# Patient Record
Sex: FEMALE | Employment: UNEMPLOYED
[De-identification: names, ages, dates, MRNs, and addresses within clinical notes are randomized per-mention and may not be internally consistent; named-entity substitution may affect disease eponyms.]

---

## 2018-11-21 ENCOUNTER — HOSPITAL ENCOUNTER (OUTPATIENT)
Dept: PHYSICAL THERAPY | Facility: CLINIC | Age: 38
Setting detail: THERAPIES SERIES
Discharge: HOME | End: 2018-11-21
Payer: COMMERCIAL

## 2018-11-21 PROCEDURE — 97750 PHYSICAL PERFORMANCE TEST: CPT

## 2018-11-21 PROCEDURE — 97161 PT EVAL LOW COMPLEX 20 MIN: CPT

## 2018-11-21 PROCEDURE — 97140 MANUAL THERAPY 1/> REGIONS: CPT

## 2018-11-21 PROCEDURE — 97110 THERAPEUTIC EXERCISES: CPT

## 2018-11-21 NOTE — CONSULTS
WNL WNL 5 4 Joelles   []   ADD     Pat-Fem Grind   []   Knee Flex WNL WNL 5 5 FADIRs   []   Ext     Hip Scouring   []   Ankle DF WNL Stiff at end range   ARTIEs   []   PF WNL WNL   Piriformis   []   INV WNL WNL   Connies   []   EVER WNL WNL   Scooter   - + []   GTE WNL 50% limited      []       OBSERVATION No Deficit Deficit Not Tested Comments   Posture       Forward Head [] [] []    Rounded Shoulders [] [] []    Kyphosis [] [] []    Lordosis [] [] []    Scoliosis [] [] []    Iliac Crest [] [x] [] R anterior innom rotation   PSIS [] [] []    ASIS [] [] []    Genu Valgus [] [] []    Genu Varus [] [] []    Genu Recurvatum [] [] []    Pronation [] [] []    Supination [] [] []    Leg Length Discrp [] [] []    Slumped Sitting [] [] []    Palpation [] [x] [] B hip flexor spasm, R glute med and piriformis spasm, R Plantarfascia fibrotic   Sensation [] [] []    Edema [] [] []    Neurological [] [] []    Patellar Mobility [] [] []    Patellar Orientation [] [] []    Gait [] [x] [] Analysis:See run analysis     Video Run Analysis   Warm up:2 min   Pace: 9:31 min/mile   Duration: 10 minutes    Shoes: NB   Shirley: 164 spm  Increased to 172spm   Frontal plane deviations:    Increased pronation R    Dynamic genu valgus    Contralateral pelvic drop           Sagittal plane deviations:     Near full knee extension at initial contact    Elevated foot ground angle at initial contact    Limited hip ext from midstance to toe off    Knees extend over toes at midstance     Recommendations:     Increase shirley by 7%    Improve flexibility at R ankle, great toe and hip ext    Improve hip strength         FUNCTIONAL TESTS PAIN NO PAIN COMMENTS   1 legged squat [] [x] R dynamic genu valgus and R lateral lean, L dynamic genu valgus   1 legged heel raise  [] [x] Positive post tib dysfunction B     Comments:  Assessment:  Pt presents with lack of hip extension ROM and strength leading to form flaws as listed above causing pain at R TFL, Comments   Prone        Flying squirrels        Hip ext (glut max)                Supine        Hip flexor s 3x30\"  x x    1 legged bridges 2x10  x x    Frog legged bridges x30  x x    Sidelying        Clams 90/30 deg 2 sets  x x    Bere hip abd 2 sets  x x    Gym        Lunges        Monster walks        Heel taps        Step downs     Posterior and lateral   Other:    Specific Instructions for next treatment:calf stretch, progress to standing program, run form     Treatment Charges: Mins Units   [x] Evaluation       [x]  Low       []  Moderate       []  High 15 1   [x] Phys perf test 10 1   [x]  Ther Exercise 20 1   [x]  Manual Therapy 15 1   []  Ther Activities     []  Aquatics     []  Vasocompression     []  NMR 60 4     TOTAL TREATMENT TIME: 60    Time in:11:00   Time Out:1210    Electronically signed by: Anitra Cavazos PT    As we have a standing verbal order from Dr. Charles Cruz, once signed, this eval/POC will serve as the formal written order. Physician Signature:________________________________Date:__________________  By signing above or cosigning this note, I have reviewed this plan of care and certify a need for medically necessary rehabilitation services.      *PLEASE SIGN ABOVE AND FAX BACK ALL PAGES*

## 2018-11-28 ENCOUNTER — HOSPITAL ENCOUNTER (OUTPATIENT)
Dept: PHYSICAL THERAPY | Facility: CLINIC | Age: 38
Setting detail: THERAPIES SERIES
Discharge: HOME | End: 2018-11-28
Payer: COMMERCIAL

## 2018-11-28 PROCEDURE — 97140 MANUAL THERAPY 1/> REGIONS: CPT

## 2018-11-28 PROCEDURE — 97110 THERAPEUTIC EXERCISES: CPT

## 2018-11-28 PROCEDURE — 97112 NEUROMUSCULAR REEDUCATION: CPT

## 2018-12-10 ENCOUNTER — HOSPITAL ENCOUNTER (OUTPATIENT)
Dept: PHYSICAL THERAPY | Facility: CLINIC | Age: 38
Setting detail: THERAPIES SERIES
Discharge: HOME | End: 2018-12-10
Payer: COMMERCIAL

## 2018-12-10 PROCEDURE — 97140 MANUAL THERAPY 1/> REGIONS: CPT

## 2018-12-10 PROCEDURE — 97112 NEUROMUSCULAR REEDUCATION: CPT

## 2018-12-10 PROCEDURE — 97110 THERAPEUTIC EXERCISES: CPT

## 2018-12-17 ENCOUNTER — HOSPITAL ENCOUNTER (OUTPATIENT)
Dept: PHYSICAL THERAPY | Facility: CLINIC | Age: 38
Setting detail: THERAPIES SERIES
Discharge: HOME | End: 2018-12-17
Payer: COMMERCIAL

## 2018-12-17 PROCEDURE — 97112 NEUROMUSCULAR REEDUCATION: CPT

## 2018-12-17 PROCEDURE — 97140 MANUAL THERAPY 1/> REGIONS: CPT

## 2018-12-17 PROCEDURE — 97110 THERAPEUTIC EXERCISES: CPT

## 2018-12-17 NOTE — FLOWSHEET NOTE
toward goals. [] No change. [x] Other: Pt is reverting back to old habits with lower castro and overstriding. Cautioned that it is easier change run mechanics solo and on a treadmill. Decreased spasm at TFL and psoas. Continue to work on [address] med strength and Neuromuscular control in the frontal plane  Pt. Education:  [x] Yes  [] No  [x] Reviewed Prior HEP/Ed  Method of Education: [x] Verbal  [x] Demo  [x] Written  [x] Verbalizes understanding. [] Demonstrates understanding. [] Needs review. [] Demonstrates/verbalizes HEP/Ed previously given. Plan: [x] Continue per plan of care.    [] Other:      Time In:0901          Time Out: 1010    Electronically signed by:  Isaura Garza, PT

## 2018-12-24 ENCOUNTER — HOSPITAL ENCOUNTER (OUTPATIENT)
Dept: PHYSICAL THERAPY | Facility: CLINIC | Age: 38
Setting detail: THERAPIES SERIES
Discharge: HOME | End: 2018-12-24
Payer: COMMERCIAL

## 2018-12-24 PROCEDURE — 97140 MANUAL THERAPY 1/> REGIONS: CPT

## 2018-12-24 PROCEDURE — 97112 NEUROMUSCULAR REEDUCATION: CPT

## 2018-12-24 PROCEDURE — 97110 THERAPEUTIC EXERCISES: CPT

## 2018-12-31 ENCOUNTER — HOSPITAL ENCOUNTER (OUTPATIENT)
Dept: PHYSICAL THERAPY | Facility: CLINIC | Age: 38
Setting detail: THERAPIES SERIES
Discharge: HOME | End: 2018-12-31
Payer: COMMERCIAL

## 2018-12-31 PROCEDURE — 97110 THERAPEUTIC EXERCISES: CPT

## 2018-12-31 PROCEDURE — 97140 MANUAL THERAPY 1/> REGIONS: CPT

## 2018-12-31 PROCEDURE — 97112 NEUROMUSCULAR REEDUCATION: CPT

## 2019-01-07 ENCOUNTER — HOSPITAL ENCOUNTER (OUTPATIENT)
Dept: PHYSICAL THERAPY | Facility: CLINIC | Age: 39
Setting detail: THERAPIES SERIES
Discharge: HOME | End: 2019-01-07
Payer: COMMERCIAL

## 2019-01-07 PROCEDURE — 97110 THERAPEUTIC EXERCISES: CPT

## 2019-01-07 PROCEDURE — 97112 NEUROMUSCULAR REEDUCATION: CPT

## 2019-01-07 PROCEDURE — 97140 MANUAL THERAPY 1/> REGIONS: CPT

## 2019-01-22 ENCOUNTER — HOSPITAL ENCOUNTER (OUTPATIENT)
Dept: PHYSICAL THERAPY | Facility: CLINIC | Age: 39
Setting detail: THERAPIES SERIES
Discharge: HOME | End: 2019-01-22
Payer: COMMERCIAL

## 2019-01-22 PROCEDURE — 97140 MANUAL THERAPY 1/> REGIONS: CPT

## 2019-01-22 PROCEDURE — 97112 NEUROMUSCULAR REEDUCATION: CPT

## 2019-01-22 PROCEDURE — 97110 THERAPEUTIC EXERCISES: CPT

## 2019-02-04 ENCOUNTER — HOSPITAL ENCOUNTER (OUTPATIENT)
Dept: PHYSICAL THERAPY | Facility: CLINIC | Age: 39
Setting detail: THERAPIES SERIES
Discharge: HOME | End: 2019-02-04
Payer: COMMERCIAL

## 2019-02-04 PROCEDURE — 97112 NEUROMUSCULAR REEDUCATION: CPT

## 2019-02-04 PROCEDURE — 97110 THERAPEUTIC EXERCISES: CPT

## 2019-02-18 ENCOUNTER — HOSPITAL ENCOUNTER (OUTPATIENT)
Dept: PHYSICAL THERAPY | Facility: CLINIC | Age: 39
Setting detail: THERAPIES SERIES
Discharge: HOME | End: 2019-02-18
Payer: COMMERCIAL

## 2019-02-18 PROCEDURE — 97140 MANUAL THERAPY 1/> REGIONS: CPT

## 2019-02-18 PROCEDURE — 97110 THERAPEUTIC EXERCISES: CPT

## 2019-08-05 ENCOUNTER — HOSPITAL ENCOUNTER (OUTPATIENT)
Dept: PHYSICAL THERAPY | Facility: CLINIC | Age: 39
Setting detail: THERAPIES SERIES
Discharge: HOME | End: 2019-08-05
Payer: COMMERCIAL

## 2019-08-05 PROCEDURE — 97140 MANUAL THERAPY 1/> REGIONS: CPT

## 2019-08-05 PROCEDURE — 97161 PT EVAL LOW COMPLEX 20 MIN: CPT

## 2019-08-05 NOTE — CONSULTS
[x] Casandra. [address] [facility] [employer]    [address] [place] [address]     Phone: [phone]     Fax:  [phone]      Physical Therapy Running Evaluation     Date:  2019  Patient: Irlanda Hill            : 1980                      MRN: [id number]  Physician: Dr. Costello Base: [employer]  Medical Diagnosis: L hamstring pain                     Rehab Codes:S76.312A  Onset date: 19                                         Next 's appt.: prn     Subjective:   CC:Pt reports she felt this pain start 2 weeks ago while she was on a run. She reports that she has not been able to train how she would like. She started on a 14 mile run a few days ago and had to end it at 6 miles due to L hamstring pain        PMHx: [x] Unremarkable            [] Diabetes         [] HTN                [] Pacemaker              [] MI/Heart Problems     [] Cancer           [] Arthritis           [] Other:              [x] Refer to full medical chart  In EPIC      Tests:  [] X-Ray:            [] MRI:                [x] none:      Medications: [x] Refer to full medical record      [] None   [] Other:  Allergies:      [x] Refer to full medical record      [] None   [] Other:     Working:  [x] Normal Duty  [] Light Duty  [] Off D/T Condition  [] Retired    [] Not Employed    []  Disability  [] Other:           Return to work:                 Job/ADL Description:  CNP NICU at [address]   Next goal race:  Ramya full in December       Pain:  [x] Yes  [] No           Location: L hip    Pain Rating: (0-10 scale) 7/10  Pain altered Tx:  [] Yes  [x] No  Action:  Symptoms:  [] Improving           [] Worsening      [x] Same  Better:  [] AM    [] PM    [] Sit    [] Not running  [x]Stand    [] Walk    [] Stretching  [] Other:  Worse: [] AM    [] PM    [x] Sit    []Stand    [x] Walk    [] Stairs    [x] Run    [] Other:  Sleep: [x] OK    [] Disturbed     Objective: Deviations  [] Other:                  STG: (to be met in 9 treatments)  1. ? Pain:<3/10 R LE pain with running up to 6 miles  2. ? ROM: Normal  ankle DF and and great toe extension to aide in run mechanics  3. ? Strength: 5/5 B hip strength to aide in run mechanics  4. ? Function:Able to run up to 6 miles with <3/10 L LE pain. Able to hold corrections made with manual techniques. 5. Independent with Home Exercise Programs     LTG: (to be met in 18 treatments)  1. Pt to demonstrate single leg squat with little to no genu valgus or lateral trunk lean and able to demo single leg heel raise without valgus collapse on lowering  2. Able to run as she wishes without LLE pain                 Patient goals:Get back to running       Rehab Potential:  [x] Good  [] Fair  [] Poor   Suggested Professional Referral:  [x] No  [] Yes:  Barriers to Goal Achievement[de-identified]  [x] No  [] Yes:  Domestic Concerns:  [x] No  [] Yes:     Pt. Education:  [x] Plans/Goals, Risks/Benefits discussed  [x] Home exercise program    Method of Education: [x] Verbal  [x] Demo  [x] Written  Comprehension of Education:  [] Verbalizes understanding. [] Demonstrates understanding. [] Needs Review.   [] Demonstrates/verbalizes understanding of HEP/Ed previously given.     Treatment Plan:  [x] Therapeutic Exercise             [] Therapeutic Activity  [x] Manual Therapy                                 [x] Alter G treadmill  [x] Phys perf test                         [x] Vasocompression/CP          [x] Neuromuscular Re-education            [] Massage                      [x] Instruction in HEP                              [] Aquatic Therapy                                                                                                                                          Frequency:  1-2x/week for 16 visits     Todays Treatment:  Manual: MET to correct L pelvic upslip, DI to hip flexors , glute med and piriformis, mobilization to T8, MFR to L

## 2019-08-08 ENCOUNTER — HOSPITAL ENCOUNTER (OUTPATIENT)
Dept: PHYSICAL THERAPY | Facility: CLINIC | Age: 39
Setting detail: THERAPIES SERIES
Discharge: HOME | End: 2019-08-08
Payer: COMMERCIAL

## 2019-08-08 PROCEDURE — 97140 MANUAL THERAPY 1/> REGIONS: CPT

## 2019-08-08 PROCEDURE — 97110 THERAPEUTIC EXERCISES: CPT

## 2019-08-13 ENCOUNTER — HOSPITAL ENCOUNTER (OUTPATIENT)
Dept: PHYSICAL THERAPY | Facility: CLINIC | Age: 39
Setting detail: THERAPIES SERIES
Discharge: HOME | End: 2019-08-13
Payer: COMMERCIAL

## 2019-08-13 PROCEDURE — 97140 MANUAL THERAPY 1/> REGIONS: CPT

## 2019-08-13 NOTE — FLOWSHEET NOTE
Vasocompression     []  Other     Total Treatment time 34 2       Assessment: [] Progressing toward goals. [] No change. [x] Other:Pt came in with L pelvic upslip that resolved with manual techniques. Pt has spasm at obturator externus that decreased with DI. Taught self release. She is to do self release and see if we can get more carryover in symptom relief so that we can do return to run. Cautioned pt that if she is feeling better not to run more than 1 mile to start. STG: (to be met in 9 treatments)  1. ? Pain:<3/10 R LE pain with running up to 6 miles  2. ? ROM: Normal  ankle DF and and great toe extension to aide in run mechanics  3. ? Strength: 5/5 B hip strength to aide in run mechanics  4. ? Function:Able to run up to 6 miles with <3/10 L LE pain. Able to hold corrections made with manual techniques. 5. Independent with Home Exercise Programs     LTG: (to be met in 18 treatments)  1. Pt to demonstrate single leg squat with little to no genu valgus or lateral trunk lean and able to demo single leg heel raise without valgus collapse on lowering  2. Able to run as she wishes without LLE pain     Patient goals:Get back to running    Pt. Education:  [] Yes  [] No  [] Reviewed Prior HEP/Ed  Method of Education: [] Verbal  [] Demo  [] Written  Comprehension of Education:  [] Verbalizes understanding. [] Demonstrates understanding. [] Needs review. [] Demonstrates/verbalizes HEP/Ed previously given. Plan: [x] Continue per plan of care.    [] Other:      Time In:1310          Time Out: 5216    Electronically signed by:  Madeline Sue, PT

## 2019-08-19 ENCOUNTER — HOSPITAL ENCOUNTER (OUTPATIENT)
Dept: PHYSICAL THERAPY | Facility: CLINIC | Age: 39
Setting detail: THERAPIES SERIES
Discharge: HOME | End: 2019-08-19
Payer: COMMERCIAL

## 2019-08-19 PROCEDURE — 97140 MANUAL THERAPY 1/> REGIONS: CPT

## 2019-08-19 NOTE — FLOWSHEET NOTE
level this date. Tenderness and spasm L hip musculature listed above. Pt was lacking segmental motion at lumbar spine that improved with mobilization and pressups. Improvement after glute max release and left painfree. Pt is to do pressups or standing trunk extension every 2 hours while awake. STG: (to be met in 9 treatments)  1. ? Pain:<3/10 R LE pain with running up to 6 miles  2. ? ROM: Normal  ankle DF and and great toe extension to aide in run mechanics  3. ? Strength: 5/5 B hip strength to aide in run mechanics  4. ? Function:Able to run up to 6 miles with <3/10 L LE pain. Able to hold corrections made with manual techniques. 5. Independent with Home Exercise Programs     LTG: (to be met in 18 treatments)  1. Pt to demonstrate single leg squat with little to no genu valgus or lateral trunk lean and able to demo single leg heel raise without valgus collapse on lowering  2. Able to run as she wishes without LLE pain     Patient goals:Get back to running    Pt. Education:  [] Yes  [] No  [] Reviewed Prior HEP/Ed  Method of Education: [] Verbal  [] Demo  [] Written  Comprehension of Education:  [] Verbalizes understanding. [] Demonstrates understanding. [] Needs review. [] Demonstrates/verbalizes HEP/Ed previously given. Plan: [x] Continue per plan of care.    [] Other:      Time In:0910          Time Out: 0945    Electronically signed by:  Clare Motley, PT

## 2019-08-26 ENCOUNTER — HOSPITAL ENCOUNTER (OUTPATIENT)
Dept: PHYSICAL THERAPY | Facility: CLINIC | Age: 39
Setting detail: THERAPIES SERIES
Discharge: HOME | End: 2019-08-26
Payer: COMMERCIAL

## 2019-08-26 PROCEDURE — 97140 MANUAL THERAPY 1/> REGIONS: CPT

## 2019-08-26 NOTE — FLOWSHEET NOTE
[] [address] [facility]   Outpatient Rehabilitation &  Therapy  [address] [place] [address]  P: [phone]  F: [phone]     Physical Therapy Daily Treatment Note    Date:  2019  Patient Name:  Lizz Gu    :  1980  MRN: [id number]  Insurance: [facility] Diagnosis: L hamstring pain                     Rehab Codes:S76.312A  Onset date: 19                                         Next Dr's appt.: prn  Visit# / total visits:     Cancels/No Shows: 0    Subjective:    Pain:  [] Yes  [] No Location: L hamstring Pain Rating: (0-10 scale) 2/10  Pain altered Tx:  [] No  [] Yes  Action:  Comments: I have something viral and am not feeling well today. Felt pretty good Pt reports she did a spin class last week and the first 40 min were good but the last 20 were not due to L hamstring pain.    Objective:  Manual:  DI to L  hip flexor proximal and distal , glute max and piriformis,MFR  to L adductors and TFL, Hip mobs: lateral glide, distraction, PA [id number]    Precautions: standard  Exercises:  Exercise Reps/ Time Weight/ Level Issued for HEP   Comments   B Gastroc and Soleus s 2'       w/ INV   Prone             Flying squirrels  2x10           Hip ext (glut max)             Pressups  4x10      x     Supine             Hip flexor s  2'        half kneel   Bridges x30   x      1 legged bridges             Frog legged bridges             Sidelying             Clams 90/30 deg     x       Bere hip abd     x       Gym             Lunges             Monster walks  2L  Blue         Heel taps             Step downs         Posterior and lateral   Other:     Specific Instructions for next treatment:Recheck pelvis, Run analysis      Treatment Charges: Mins Units   []  Modalities     [x]  Ther Exercise 5 NC   [x]  Manual Therapy 30 2   []  Ther Activities     []  Aquatics     []  Vasocompression     []  Other     Total Treatment time 35 2       Assessment: [] Progressing toward goals.    [] No change. [x] Other:Pt 's pelvis is level this date. Less proximal hip flexor spasm. Held exercise with the exception of pressups due to pt feeling ill. Still feeling heavy at the leg but normal hip flexion actively in standing post manual techniques. STG: (to be met in 9 treatments)  1. ? Pain:<3/10 R LE pain with running up to 6 miles  2. ? ROM: Normal  ankle DF and and great toe extension to aide in run mechanics  3. ? Strength: 5/5 B hip strength to aide in run mechanics  4. ? Function:Able to run up to 6 miles with <3/10 L LE pain. Able to hold corrections made with manual techniques. 5. Independent with Home Exercise Programs     LTG: (to be met in 18 treatments)  1. Pt to demonstrate single leg squat with little to no genu valgus or lateral trunk lean and able to demo single leg heel raise without valgus collapse on lowering  2. Able to run as she wishes without LLE pain     Patient goals:Get back to running    Pt. Education:  [] Yes  [] No  [] Reviewed Prior HEP/Ed  Method of Education: [] Verbal  [] Demo  [] Written  Comprehension of Education:  [] Verbalizes understanding. [] Demonstrates understanding. [] Needs review. [] Demonstrates/verbalizes HEP/Ed previously given. Plan: [x] Continue per plan of care.    [] Other:      Time In:0810         Time Out: 0840    Electronically signed by:  Madeline Sue, PT

## 2019-09-04 ENCOUNTER — HOSPITAL ENCOUNTER (OUTPATIENT)
Dept: PHYSICAL THERAPY | Facility: CLINIC | Age: 39
Setting detail: THERAPIES SERIES
Discharge: HOME | End: 2019-09-04
Payer: COMMERCIAL

## 2019-09-04 PROCEDURE — 97140 MANUAL THERAPY 1/> REGIONS: CPT

## 2019-09-04 PROCEDURE — 97110 THERAPEUTIC EXERCISES: CPT

## 2019-09-09 ENCOUNTER — HOSPITAL ENCOUNTER (OUTPATIENT)
Dept: PHYSICAL THERAPY | Facility: CLINIC | Age: 39
Setting detail: THERAPIES SERIES
Discharge: HOME | End: 2019-09-09
Payer: COMMERCIAL

## 2019-09-09 PROCEDURE — 97110 THERAPEUTIC EXERCISES: CPT

## 2019-09-09 PROCEDURE — 97140 MANUAL THERAPY 1/> REGIONS: CPT

## 2019-09-09 NOTE — FLOWSHEET NOTE
[x] [facility] [place]  Outpatient Rehabilitation &  Therapy  [address]  P: [phone]  F: [phone]     Physical Therapy Daily Treatment Note    Date:  2019  Patient Name:  Molly Chamberlain    :  1980  MRN: [id number]  Insurance: [facility]  Medical Diagnosis: L hamstring pain                     Rehab Codes:S76.312A  Onset date: 19                                         Next Dr's appt.: prn  Visit# / total visits:     Cancels/No Shows: 0    Subjective:    Pain:  [x] Yes  [] No Location: L hamstring Pain Rating: (0-10 scale) 3/10  Pain altered Tx:  [] No  [] Yes  Action:  Comments: Pt reports that she is having the same pain in her buttocks and it has not changed    Objective:  Manual:  DI to B  hip flexor proximal, piriformis,  MET to correct L pelvic upslip    Precautions: standard  Exercises:  Exercise Reps/ Time Weight/ Level Issued for HEP   Comments   Hamstring s 3x30\"   x    B Gastroc and Soleus s 2'       w/ INV   Prone             Flying squirrels  2x10           Hip ext (glut max)             Pressups  4x10      x     Supine             Hip flexor s  2'        half kneel   Bridges 2x15   x x     1 legged bridges             Roll for control 10x10\"       Frog legged bridges             Sidelying             Clams 90/30 deg     x       Bere hip abd     x       Gym             Lunges             Monster walks  2L  Blue         Heel taps             SB march w/ opp arm reach 2x15   x    Quadruped birddog 2x15   x    SB ABCs 1 set       Step downs         Posterior and lateral   Other:     Specific Instructions for next treatment:       Treatment Charges: Mins Units   []  Modalities     [x]  Ther Exercise 10 1   [x]  Manual Therapy 35 1   []  Ther Activities     []  Aquatics     []  Vasocompression     []  Other     Total Treatment time 45 3       Assessment: [] Progressing toward goals. [] No change.      [x] Other: Pt had continued soft tissue tightness w/ R

## 2019-09-16 ENCOUNTER — HOSPITAL ENCOUNTER (OUTPATIENT)
Dept: PHYSICAL THERAPY | Facility: CLINIC | Age: 39
Setting detail: THERAPIES SERIES
Discharge: HOME | End: 2019-09-16
Payer: COMMERCIAL

## 2019-09-16 PROCEDURE — 97140 MANUAL THERAPY 1/> REGIONS: CPT

## 2019-09-16 PROCEDURE — 97110 THERAPEUTIC EXERCISES: CPT

## 2019-09-16 NOTE — FLOWSHEET NOTE
Treatment Charges: Mins Units   []  Modalities     [x]  Ther Exercise 20 1   [x]  Manual Therapy 30 2   []  Ther Activities     []  Aquatics     []  Vasocompression     []  Other     Total Treatment time 50 3       Assessment: [] Progressing toward goals. [] No change. [x] Other: Pt presented with L posterior innom rotation on this date. Performed MET correction and pubic MET with a positive pop. Focused on pelvic floor exercises today. STG: (to be met in 9 treatments)  1. ? Pain:<3/10 R LE pain with running up to 6 miles  2. ? ROM: Normal  ankle DF and and great toe extension to aide in run mechanics  3. ? Strength: 5/5 B hip strength to aide in run mechanics  4. ? Function:Able to run up to 6 miles with <3/10 L LE pain. Able to hold corrections made with manual techniques. 5. Independent with Home Exercise Programs     LTG: (to be met in 18 treatments)  1. Pt to demonstrate single leg squat with little to no genu valgus or lateral trunk lean and able to demo single leg heel raise without valgus collapse on lowering  2. Able to run as she wishes without LLE pain     Patient goals:Get back to running    Pt. Education:  [x] Yes  [] No  [x] Reviewed Prior HEP/Ed  Method of Education: [] Verbal  [x] Demo  [x] Written (MET self corrections, innominate and pubic)  Comprehension of Education:  [x] Verbalizes understanding. [x] Demonstrates understanding. [] Needs review. [] Demonstrates/verbalizes HEP/Ed previously given. Plan: [x] Continue per plan of care.    [] Other:      Time In: 7:00am         Time Out: 7:50am    Electronically signed by:  Tom Mckeon

## 2019-09-23 ENCOUNTER — HOSPITAL ENCOUNTER (OUTPATIENT)
Dept: PHYSICAL THERAPY | Facility: CLINIC | Age: 39
Setting detail: THERAPIES SERIES
Discharge: HOME | End: 2019-09-23
Payer: COMMERCIAL

## 2019-09-25 ENCOUNTER — HOSPITAL ENCOUNTER (OUTPATIENT)
Dept: PHYSICAL THERAPY | Facility: CLINIC | Age: 39
Setting detail: THERAPIES SERIES
Discharge: HOME | End: 2019-09-25
Payer: COMMERCIAL

## 2019-09-25 PROCEDURE — 97140 MANUAL THERAPY 1/> REGIONS: CPT

## 2019-09-25 PROCEDURE — 97016 VASOPNEUMATIC DEVICE THERAPY: CPT

## 2019-09-25 PROCEDURE — 97110 THERAPEUTIC EXERCISES: CPT

## 2019-09-25 NOTE — FLOWSHEET NOTE
Posterior and lateral   Other:     Specific Instructions for next treatment:       Treatment Charges: Mins Units   []  Modalities     [x]  Ther Exercise 35 2   [x]  Manual Therapy 15 1   []  Ther Activities     []  Aquatics     []  Vasocompression     []  Other     Total Treatment time 50 3       Assessment: [] Progressing toward goals. [] No change. [x] Other: Pt presented with L posterior innom rotation on this date. Performed MET correction and pubic MET with a positive pop. Pt is able to do all activities w/o ill effects. Increased amount of pelvic floor exercises on this date. Added IR/ER rotations with good pt tolerance. STG: (to be met in 9 treatments)  1. ? Pain:<3/10 R LE pain with running up to 6 miles  2. ? ROM: Normal  ankle DF and and great toe extension to aide in run mechanics  3. ? Strength: 5/5 B hip strength to aide in run mechanics  4. ? Function:Able to run up to 6 miles with <3/10 L LE pain. Able to hold corrections made with manual techniques. 5. Independent with Home Exercise Programs     LTG: (to be met in 18 treatments)  1. Pt to demonstrate single leg squat with little to no genu valgus or lateral trunk lean and able to demo single leg heel raise without valgus collapse on lowering  2. Able to run as she wishes without LLE pain     Patient goals:Get back to running    Pt. Education:  [x] Yes  [] No  [x] Reviewed Prior HEP/Ed  Method of Education: [] Verbal  [x] Demo  [x] Written (MET self corrections, innominate and pubic)  Comprehension of Education:  [x] Verbalizes understanding. [x] Demonstrates understanding. [] Needs review. [] Demonstrates/verbalizes HEP/Ed previously given. Plan: [x] Continue per plan of care.    [] Other:      Time In: 12:05pm         Time Out: 1:00pm    Electronically signed by:  Eric Sharma

## 2019-12-18 ENCOUNTER — HOSPITAL ENCOUNTER (OUTPATIENT)
Dept: PHYSICAL THERAPY | Facility: CLINIC | Age: 39
Setting detail: THERAPIES SERIES
Discharge: HOME | End: 2019-12-18
Payer: COMMERCIAL

## 2019-12-18 PROCEDURE — 97110 THERAPEUTIC EXERCISES: CPT

## 2019-12-18 PROCEDURE — 97140 MANUAL THERAPY 1/> REGIONS: CPT

## 2019-12-27 ENCOUNTER — HOSPITAL ENCOUNTER (OUTPATIENT)
Dept: PHYSICAL THERAPY | Facility: CLINIC | Age: 39
Setting detail: THERAPIES SERIES
Discharge: HOME | End: 2019-12-27
Payer: COMMERCIAL

## 2019-12-27 PROCEDURE — 97140 MANUAL THERAPY 1/> REGIONS: CPT

## 2020-01-02 ENCOUNTER — HOSPITAL ENCOUNTER (OUTPATIENT)
Dept: PHYSICAL THERAPY | Facility: CLINIC | Age: 40
Setting detail: THERAPIES SERIES
Discharge: HOME | End: 2020-01-02
Payer: COMMERCIAL

## 2020-01-02 PROCEDURE — 97140 MANUAL THERAPY 1/> REGIONS: CPT

## 2020-01-02 PROCEDURE — 97110 THERAPEUTIC EXERCISES: CPT

## 2020-01-03 NOTE — FLOWSHEET NOTE
[] [facility]  Outpatient Rehabilitation &  Therapy  [address]  [phone]  F: [phone] [] [address]  [facility]   [address]  P: [phone]  F: [phone] [x] [address]  Therapy  [address] [facility]  P: [phone]  F: [phone] [] [address]  [facility]   [address]   [place]: [phone]  F: [phone]      Physical Therapy Daily Treatment Note    Date:  2020  Patient Name:  Ced Paez    :  1980  MRN: [id number]  Physician: Dr. Candelario Chavira: [address]  Medical Diagnosis: L pubic ramus fx and L acetabular fx                    Rehab Codes:S76.312A   Onset Date: 19                 Next Dr. Thomas Knight: 2 weeks  Visit# / total visits: 3/20  Cancels/No Shows: 0/0    Subjective:    Pain:  [] Yes  [] No Location: L hip  Pain Rating: (0-10 scale) 1-2/10  Pain altered Tx:  [x] No  [] Yes  Action:  Comments:  Pt reports pain is about the same but able to walk 2500 steps in a day and deep cleaned a couple rooms on all 4's today without increased symptoms. Objective:  Modalities:   Precautions:  Manual:  DI to hip flexors , glute med and piriformis, L lateral head gastroc, L QL  Precautions: gentle core and hip strengthening.  Healing stress fracture of L acetabulum and pubic ramus  Exercises:  Exercise Reps/ Time Weight/ Level Issued for HEP   Comments   B Gastroc and Soleus s 2'           Prone             Flying squirrels             Hip ext (glut max)  2x10   x  x  2 pillows to neutral hip ext    Hip abd  2x10   x  -     Supine             ADIM walkouts w/ TB UE pulldown 2x10  Grey x x     ADIM leg fall outs 2x10   x x hooklying   ADIM with alt UE 2X10 2# x      Hip flexor s             Bridges             1 legged bridges             Frog legged bridges             Sidelying             Clams 90/30 deg 2x10   x  -     Bere hip abd  2x10   x  -     Gym             Heel tap  x8  4\"  x  x  limited ROM   Monster walks                          Step ups  x10  6\"  x  x Posterior and lateral    Next visit: Quadruped exercises. Treatment Charges: Mins Units   []  Modalities     [x]  Ther Exercise 20 1   [x]  Manual Therapy 25 2   []  Ther Activities     []  Aquatics     []  Vasocompression     []  Other     Total Treatment time 45 3       Assessment: [] Progressing toward goals. [] No change. [x] Other: Improved response to manual techniques this date with nice release with each muscle DI technique was used on. Pt reported poor ability to perform glute max hip extension. She was not performing eccentric portion with glute max and substituted with HS. She was able to correct with verbal and manual cue. Still has poor eccentric quad control. Added heel tap in limited ROM to ensure no increase in L hip adduction. STG 10 visits:  1. Normal hip extension ROM to allow for improved glute activation  2. 5/5 L hip strength to normalize walking gait  3. Pt able to walk with normal gait quality community distances without pain  4. Pt able to hop painfree with proper landing mechanics when cleared by physician to prepare for the impact of running      LTG 20 visits:  1. Pt able to run 1 mile painfree with acceptable mechanics to reduce loading rates and understand proper progression for getting back IND to a normal training load     Pt. Education:  [x] Yes  [] No  [] Reviewed Prior HEP/Ed  Method of Education: [x] Verbal  [] Demo  [] Written  Comprehension of Education:  [x] Verbalizes understanding. [] Demonstrates understanding. [] Needs review. [] Demonstrates/verbalizes HEP/Ed previously given. Plan: [x] Continue per plan of care.    [] Other:      Time In: 6:25pm            Time Out: 7:10pm    Electronically signed by:  Porter Mccoy, PT

## 2020-01-08 ENCOUNTER — HOSPITAL ENCOUNTER (OUTPATIENT)
Dept: PHYSICAL THERAPY | Facility: CLINIC | Age: 40
Setting detail: THERAPIES SERIES
Discharge: HOME | End: 2020-01-08
Payer: COMMERCIAL

## 2020-01-08 PROCEDURE — 97140 MANUAL THERAPY 1/> REGIONS: CPT

## 2020-01-08 NOTE — FLOWSHEET NOTE
bridges             Frog legged bridges             Sidelying             Clams 90/30 deg 2x10   x  -     Bere hip abd  2x10   x  -     Gym             Heel tap  x8  4\"  x   limited ROM   Monster walks                          Step ups  x10  6\"  x   Posterior and lateral    Next visit: Quadruped exercises. Treatment Charges: Mins Units   []  Modalities     [x]  Ther Exercise 2 NC   [x]  Manual Therapy 35 2   []  Ther Activities     []  Aquatics     []  Vasocompression     []  Other     Total Treatment time 37 2       Assessment: [] Progressing toward goals. [] No change. [x] Other: Pt ambulating with decreased stance time on L this date. Normal gait after manual work. Pt had more spasm in rectus femoris and TFL today. Muscles relaxed nicely and normal hip extension post manual.    STG 10 visits:  1. Normal hip extension ROM to allow for improved glute activation  2. 5/5 L hip strength to normalize walking gait  3. Pt able to walk with normal gait quality community distances without pain  4. Pt able to hop painfree with proper landing mechanics when cleared by physician to prepare for the impact of running      LTG 20 visits:  1. Pt able to run 1 mile painfree with acceptable mechanics to reduce loading rates and understand proper progression for getting back IND to a normal training load     Pt. Education:  [x] Yes  [] No  [] Reviewed Prior HEP/Ed  Method of Education: [x] Verbal  [] Demo  [] Written  Comprehension of Education:  [x] Verbalizes understanding. [] Demonstrates understanding. [] Needs review. [] Demonstrates/verbalizes HEP/Ed previously given. Plan: [x] Continue per plan of care.    [] Other:      Time In: 1005           Time Out: 1045    Electronically signed by:  Solange Robles, PT

## 2020-01-13 ENCOUNTER — HOSPITAL ENCOUNTER (OUTPATIENT)
Dept: PHYSICAL THERAPY | Facility: CLINIC | Age: 40
Setting detail: THERAPIES SERIES
Discharge: HOME | End: 2020-01-13
Payer: COMMERCIAL

## 2020-01-13 PROCEDURE — 97140 MANUAL THERAPY 1/> REGIONS: CPT

## 2020-01-13 NOTE — FLOWSHEET NOTE
[] [address]  Outpatient Rehabilitation &  Therapy  [address]  [phone]  F: [phone] [] [address]  [facility] 36   [address]  P: [phone]  F: [phone] [x] [address] &  Therapy  [address] [facility]  P: [phone]  F: [phone] [] [address]  [facility]   [address]   [place]: [phone]  F: [phone]      Physical Therapy Daily Treatment Note    Date:  2020  Patient Name:  Marga Keane    :  1980  MRN: [id number]  Physician: Dr. Fry [place]: Sabina Sykes  Medical Diagnosis: L pubic ramus fx and L acetabular fx                    Rehab Codes:S76.312A   Onset Date: 19                 Next Dr. Leonard Furrow: 20  Visit# / total visits:   Cancels/No Shows: 0/0    Subjective:    Pain:  [] Yes  [] No Location: L hip  Pain Rating: (0-10 scale) 3/10  Pain altered Tx:  [x] No  [] Yes  Action:  Comments:  Had to walk more the other day and felt a lot of burning in my glute     Objective:  Modalities:   Precautions:  Manual:  DI to hip flexors , glute med, glute max and piriformis,  L QL, MFR L calf  Precautions: gentle core and hip strengthening.  Healing stress fracture of L acetabulum and pubic ramus  Exercises:  Exercise Reps/ Time Weight/ Level Issued for HEP   Comments   B Gastroc and Soleus s 2'           Prone             Flying squirrels             Hip ext (glut max)  2x10   x    2 pillows to neutral hip ext    Hip abd  2x10   x  -     Supine             ADIM walkouts w/ TB UE pulldown 2x10  Grey x      ADIM leg fall outs 2x10   x  hooklying   ADIM with alt UE 2X10 2# x      Hip flexor s  2'           Bridges             1 legged bridges             Frog legged bridges             Sidelying             Clams 90/30 deg 2x10   x  -     Bere hip abd  2x10   x  x     Gym             Heel tap  x8  4\"  x   limited ROM   Monster walks                          Step ups  x10  6\"  x   Posterior and lateral    Next visit: Quadruped exercises. Treatment Charges: Mins Units   []  Modalities     [x]  Ther Exercise 2 NC   [x]  Manual Therapy 35 2   []  Ther Activities     []  Aquatics     []  Vasocompression     []  Other     Total Treatment time 37 2       Assessment: [] Progressing toward goals. [] No change. [x] Other: Pt ambulating with equal step length by the time she left this date. Less spasm. Normal hip extension post manual and trigger points are reducing in size and tenderness. Pt will do her first Aquajog class this date. STG 10 visits:  1. Normal hip extension ROM to allow for improved glute activation  2. 5/5 L hip strength to normalize walking gait  3. Pt able to walk with normal gait quality community distances without pain  4. Pt able to hop painfree with proper landing mechanics when cleared by physician to prepare for the impact of running      LTG 20 visits:  1. Pt able to run 1 mile painfree with acceptable mechanics to reduce loading rates and understand proper progression for getting back IND to a normal training load     Pt. Education:  [x] Yes  [] No  [] Reviewed Prior HEP/Ed  Method of Education: [x] Verbal  [] Demo  [] Written  Comprehension of Education:  [x] Verbalizes understanding. [] Demonstrates understanding. [] Needs review. [] Demonstrates/verbalizes HEP/Ed previously given. Plan: [x] Continue per plan of care.    [] Other:      Time In: 1005           Time Out: 1045    Electronically signed by:  Maddison Arevalo PT

## 2020-01-20 ENCOUNTER — HOSPITAL ENCOUNTER (OUTPATIENT)
Dept: PHYSICAL THERAPY | Facility: CLINIC | Age: 40
Setting detail: THERAPIES SERIES
Discharge: HOME | End: 2020-01-20
Payer: COMMERCIAL

## 2020-01-20 PROCEDURE — 97110 THERAPEUTIC EXERCISES: CPT

## 2020-01-20 PROCEDURE — 97140 MANUAL THERAPY 1/> REGIONS: CPT

## 2020-01-29 ENCOUNTER — HOSPITAL ENCOUNTER (OUTPATIENT)
Dept: PHYSICAL THERAPY | Facility: CLINIC | Age: 40
Setting detail: THERAPIES SERIES
Discharge: HOME | End: 2020-01-29
Payer: COMMERCIAL

## 2020-01-29 PROCEDURE — 97140 MANUAL THERAPY 1/> REGIONS: CPT

## 2020-01-29 NOTE — FLOWSHEET NOTE
[] [address]  Outpatient Rehabilitation &  Therapy  [address]  [phone]  F: [phone] [] [address]  [facility] 36   [address]  P: [phone]  F: [phone] [x] [address] &  Therapy  1500 [facility]  P: [phone]  F: [phone] [] [address]  [facility]   [address]   [place]: [phone]  F: [phone]      Physical Therapy Daily Treatment Note    Date:  2020  Patient Name:  Ced Paez    :  1980  MRN: [id number]  Physician: Dr. Candelario Chavira: [address]  Medical Diagnosis: L pubic ramus fx and L acetabular fx                    Rehab Codes:S76.312A   Onset Date: 19                 Next Dr. Thomas Vargas: 20  Visit# / total visits:   Cancels/No Shows: 0/0    Subjective:    Pain:  [] Yes  [] No Location: L hip  Pain Rating: (0-10 scale) 3/10  Pain altered Tx:  [x] No  [] Yes  Action:  Comments:  Took 3 days to recover from going to [employer]    Objective:  Manual:  DI to hip flexors , glute med, glute max and piriformis,  L QL  Precautions: gentle core and hip strengthening.  Healing stress fracture of L acetabulum and pubic ramus  Exercises:  Exercise Reps/ Time Weight/ Level Issued for HEP   Comments   B Gastroc andSoleus s 2'           Prone             Flying squirrels             Hip ext (glut max)  2x10   x    2 pillows to neutral hip ext    Hip abd  2x10   x  -     Supine             ADIM walkouts w/ TB UE pulldown 2x10  Grey x      ADIM leg fall outs 2x10   x  hooklying   ADIM with alt UE 2X10 2# x      Hip flexor s  2'           Bridges             1 legged bridges             Frog legged bridges             Sidelying             Clams 90/30 deg 2x10   x  -     Bere hip abd  2x10   x       Gym             Heel tap  x8  4\"  x   limited ROM   Monster

## 2020-02-03 ENCOUNTER — HOSPITAL ENCOUNTER (OUTPATIENT)
Dept: PHYSICAL THERAPY | Facility: CLINIC | Age: 40
Setting detail: THERAPIES SERIES
Discharge: HOME | End: 2020-02-03
Payer: COMMERCIAL

## 2020-02-03 PROCEDURE — 97140 MANUAL THERAPY 1/> REGIONS: CPT

## 2020-02-03 PROCEDURE — 97110 THERAPEUTIC EXERCISES: CPT

## 2020-02-03 NOTE — FLOWSHEET NOTE
[] [facility]) [facility] &  Therapy  [address]  [phone]  F: [phone] [] [address]  [place] 36   [address]  P: [phone]  F: [phone] [x] [address] &  Therapy  1500 [facility]  P: [phone]  F: [phone] [] [address]  [facility]   [address]   [place]: [phone]  F: [phone]      Physical Therapy Daily Treatment Note    Date:  2/3/2020  Patient Name:  Taylor Saul    :  1980  MRN: [id number]  Physician: Dr. Lopez Clock: Yola Negro  Medical Diagnosis: L pubic ramus fx and L acetabular fx                    Rehab Codes:S76.312A   Onset Date: 19                 Next Dr. Mika Tabareser: 20  Visit# / total visits:   Cancels/No Shows: 0/0    Subjective:    Pain:  [] Yes  [] No Location: L hip  Pain Rating: (0-10 scale) 3/10  Pain altered Tx:  [x] No  [] Yes  Action:  Comments:  Doing better. Objective:  Manual:  DI to hip flexors , glute med, glute max and piriformis,  L QL, L adductor  Precautions: gentle core and hip strengthening.  Healing stress fracture of L acetabulum and pubic ramus  Exercises:  Exercise Reps/ Time Weight/ Level Issued for HEP   Comments   B Gastroc andSoleus s 2'           Prone             Flying squirrels             Hip ext (glut max)  2x10   x    2 pillows to neutral hip ext    Hip abd  2x10   x  -     Supine             ADIM walkouts w/ TB UE pulldown 2x10  Grey x      ADIM leg fall outs 2x10   x  hooklying   ADIM with alt UE 2X10 2# x      Hip flexor s  2'           Bridges             1 legged bridges             Frog legged bridges             Sidelying             Clams 90/30 deg 2x10   x  -     Bere hip abd  2x10   x       Gym             Heel tap  x8  4\"  x   limited ROM   Monster walks

## 2020-02-10 ENCOUNTER — HOSPITAL ENCOUNTER (OUTPATIENT)
Dept: PHYSICAL THERAPY | Facility: CLINIC | Age: 40
Setting detail: THERAPIES SERIES
Discharge: HOME | End: 2020-02-10
Payer: COMMERCIAL

## 2020-02-10 PROCEDURE — 97140 MANUAL THERAPY 1/> REGIONS: CPT

## 2020-02-10 PROCEDURE — 97110 THERAPEUTIC EXERCISES: CPT

## 2020-02-10 NOTE — FLOWSHEET NOTE
[] [facility]) [facility] &  Therapy  [address]  [phone]  F: [phone] [] [address]  [facility]   [address]  P: [phone]  F: [phone] [x] [address] &  Therapy  1500 [facility]  P: [phone]  F: [phone] [] [address]  [facility]   [address]   [place]: [phone]  F: [phone]      Physical Therapy Daily Treatment Note    Date:  2/10/2020  Patient Name:  Gretchen Bang    :  1980  MRN: [id number]  Physician: Dr. Banks Reveal: Ermelinda Swann  Medical Diagnosis: L pubic ramus fx and L acetabular fx                    Rehab Codes:S76.312A   Onset Date: 19                 Next Dr. Jose Quiroga: 20  Visit# / total visits:   Cancels/No Shows: 0/0    Subjective:    Pain:  [] Yes  [] No Location: L hip  Pain Rating: (0-10 scale) 3/10  Pain altered Tx:  [x] No  [] Yes  Action:  Comments:  Doing better. Objective:  Manual:  DI to hip flexors , glute med, glute max and piriformis,  L QL, L adductor  Precautions: gentle core and hip strengthening.  Healing stress fracture of L acetabulum and pubic ramus  Exercises:  Exercise Reps/ Time Weight/ Level Issued for HEP   Comments   B Gastroc andSoleus s 2'           Prone             Flying squirrels             Hip ext (glut max)  2x10   x    2 pillows to neutral hip ext    Hip abd  2x10   x  -     Supine             ADIM walkouts w/ TB UE pulldown 2x10  Grey x      ADIM leg fall outs 2x10   x  hooklying   ADIM with alt UE 2X10 2# x      Hip flexor s  2'           Bridges             1 legged bridges             Frog legged bridges             Sidelying             Clams 90/30 deg 2x10   x  -     Bere hip abd  2x10   x       Gym             Heel tap  x8  4\"  x   limited ROM   Monster walks                        Step ups  x10  6\"  x   Posterior and lateral   Quadruped firehydrant 2x10  x     TG single leg squat 1set L15   Supine and sidelying   TG SL HR 2 sets L15      Alter G XS shorts       TM walk 12' 2.0mph  x 70%BW   TM retro walk 5' 0.8  x 70%    Next visit: AlterG walk    Treatment Charges: Mins Units   []  Modalities     [x]  Ther Exercise 10 1   [x]  Manual Therapy 15 1   []  Ther Activities     []  Aquatics     []  Vasocompression     []  Other     Total Treatment time 25 2       Assessment: [] Progressing toward goals. [] No change. [x] Other: Able to increase time on AlterG and walk retro to increase quad recruitment. Less fatigue noted post walk this date. STG 10 visits:  1. Normal hip extension ROM to allow for improved glute activation  2. 5/5 L hip strength to normalize walking gait  3. Pt able to walk with normal gait quality community distances without pain  4. Pt able to hop painfree with proper landing mechanics when cleared by physician to prepare for the impact of running      LTG 20 visits:  1. Pt able to run 1 mile painfree with acceptable mechanics to reduce loading rates and understand proper progression for getting back IND to a normal training load     Pt. Education:  [x] Yes  [] No  [] Reviewed Prior HEP/Ed  Method of Education: [x] Verbal  [] Demo  [] Written  Comprehension of Education:  [x] Verbalizes understanding. [] Demonstrates understanding. [] Needs review. [] Demonstrates/verbalizes HEP/Ed previously given. Plan: [x] Continue per plan of care.    [] Other:      Time In: 0060          Time Out: 0380    Electronically signed by:  Meng Loco, PT

## 2020-02-18 ENCOUNTER — HOSPITAL ENCOUNTER (OUTPATIENT)
Dept: PHYSICAL THERAPY | Facility: CLINIC | Age: 40
Setting detail: THERAPIES SERIES
Discharge: HOME | End: 2020-02-18
Payer: COMMERCIAL

## 2020-02-18 PROCEDURE — 97110 THERAPEUTIC EXERCISES: CPT

## 2020-02-18 PROCEDURE — 97140 MANUAL THERAPY 1/> REGIONS: CPT

## 2020-02-18 NOTE — FLOWSHEET NOTE
tap  x8  4\"  x   limited ROM   Monster walks                          Step ups  x10  6\"  x   Posterior and lateral   Quadruped firehydrant 2x10  x     TG single leg squat 1set L15   Supine and sidelying   TG SL HR 2 sets L15      Alter G XS shorts       TM walk 15' 3. 2mph  x 75%BW   TM retro walk 5' 0.8  x 75%   Calf raise 2 sets   x 75%   Single leg squat 2x10   x 75%   Hip hike 2 sets    x 75%   SL stand CW/CCW/Fig x10 ea   x 75%    Next visit: [facility] walk    Treatment Charges: Mins Units   []  Modalities     [x]  Ther Exercise 15 1   [x]  Manual Therapy 25 2   []  Ther Activities     []  Aquatics     []  Vasocompression     []  Other     Total Treatment time 40 3       Assessment: [] Progressing toward goals. [] No change. [x] Other: Small TP's in glutes, calf and spasm at hip flexor on the L. These improved with manual techniques. ABle to increase exercise in [facility] today. Only needed to monitor intermittently throughout walking in [facility]. Cue for \"Stable hip\" in stance helped to get more equal WB B and less lateral sway as pt fatigued    STG 10 visits:  1. Normal hip extension ROM to allow for improved glute activation  2. 5/5 L hip strength to normalize walking gait  3. Pt able to walk with normal gait quality community distances without pain  4. Pt able to hop painfree with proper landing mechanics when cleared by physician to prepare for the impact of running      LTG 20 visits:  1. Pt able to run 1 mile painfree with acceptable mechanics to reduce loading rates and understand proper progression for getting back IND to a normal training load     Pt. Education:  [x] Yes  [] No  [] Reviewed Prior HEP/Ed  Method of Education: [x] Verbal  [] Demo  [] Written  Comprehension of Education:  [x] Verbalizes understanding. [] Demonstrates understanding. [] Needs review. [] Demonstrates/verbalizes HEP/Ed previously given. Plan: [x] Continue per plan of care.    [] Other:      Time In: 6516          Time

## 2020-02-25 ENCOUNTER — HOSPITAL ENCOUNTER (OUTPATIENT)
Dept: PHYSICAL THERAPY | Facility: CLINIC | Age: 40
Setting detail: THERAPIES SERIES
Discharge: HOME | End: 2020-02-25
Payer: COMMERCIAL

## 2020-02-25 NOTE — FLOWSHEET NOTE
[] [address]  [address]    [phone]  F: [phone]   [] [address] [facility] 36   [address]  P: [phone]  F: [phone]  [] [address]  [address] [facility]  P: [phone]  F: [phone]  [] [address] [facility]   [address]   [place]: [phone]  F: [phone]   [] [place] [address] [facility] [address]  [place]: [phone]   F: [phone]     Physical Therapy Cancel/No Show note    Date: 2020  Patient: Annabelle Tirado  : 1980  MRN: [id number]    Cancels/No Shows to date:     For today's appointment patient:    [x]  Cancelled    [] Rescheduled appointment    [] No-show     Reason given by patient:    [x]  Patient ill    []  Conflicting appointment    [] No transportation      [] Conflict with work    [] No reason given    [] Weather related    [] Other:      Comments:        [] Next appointment was confirmed    Electronically signed by: Susan Polanco PT

## 2020-03-02 ENCOUNTER — HOSPITAL ENCOUNTER (OUTPATIENT)
Dept: PHYSICAL THERAPY | Facility: CLINIC | Age: 40
Setting detail: THERAPIES SERIES
Discharge: HOME | End: 2020-03-02
Payer: COMMERCIAL

## 2020-03-02 PROCEDURE — 97140 MANUAL THERAPY 1/> REGIONS: CPT

## 2020-03-02 PROCEDURE — 97110 THERAPEUTIC EXERCISES: CPT

## 2020-03-02 NOTE — FLOWSHEET NOTE
[] [facility]) - [facility] &  Therapy  [address]  [phone]  F: [phone] [] [address]  [place] 36   [address]  P: [phone]  F: [phone] [x] [address] &  Therapy  [address]  P: [phone]  F: [phone] [] [address]  [facility]   [address]   [place]: [phone]  F: [phone]      Physical Therapy Daily Treatment Note    Date:  3/2/2020  Patient Name:  Heron Kaur    :  1980  MRN: [id number]  Physician: Dr. Elisabeth White: Yoly Henriquez  Medical Diagnosis: L pubic ramus fx and L acetabular fx                    Rehab Codes:S76.312A   Onset Date: 19                 Next Dr. Suzette Wright: 20  Visit# / total visits:   Cancels/No Shows: 0/0    Subjective:    Pain:  [] Yes  [] No Location: L hip  Pain Rating: (0-10 scale) 3/10  Pain altered Tx:  [x] No  [] Yes  Action:  Comments:  Before I got sick I had an appt with Sports Med doc. Dr. Zita Torre gave the ok to continue to progress BW in AlterG  and return to running in AlterG when stronger     Objective:  Manual:  DI to L calf,glute med and piriformis, DI  L QL   Precautions: gentle core and hip strengthening.  Healing stress fracture of L acetabulum and pubic ramus  Exercises:  Exercise Reps/ Time Weight/ Level Issued for HEP   Comments   B Gastroc andSoleus s 2'      x     Prone             Flying squirrels             Hip ext (glut max)  2x10   x    2 pillows to neutral hip ext    Hip abd  2x10   x  -     Supine             ADIM walkouts w/ TB UE pulldown 2x10  Grey x      ADIM leg fall outs 2x10   x  hooklying   ADIM with alt UE 2X10 2# x      Hip flexor s  2'           Bridges             1 legged bridges             Frog legged bridges             Sidelying             Clams 90/30 deg 2x10   x  -   Bere hip abd  2x10   x       Gym             Heel tap  x8  4\"  x   limited ROM   Monster walks                          Step ups  x10  6\"  x   Posterior and lateral   Quadruped firehydrant 2x10  x     TG single leg squat 1set L15   Supine and sidelying   TG SL HR 2 sets L15      Alter G XS shorts       TM walk 15' 3.0mph  x 90%BW   TM retro walk 5' 0.8  x 90%   Squat x30   x 90%   Calf raise 2 sets   x 90%   Single leg squat 2x10   x 90%   Hip hike 2 sets    x 90%   SL stand CW/CCW/Fig 8 x10 ea   x 90%    Next visit: AlterG walk    Treatment Charges: Mins Units   []  Modalities     [x]  Ther Exercise 30 2   [x]  Manual Therapy 20 1   []  Ther Activities     []  Aquatics     []  Vasocompression     []  Other     Total Treatment time 50 3       Assessment: [] Progressing toward goals. [] No change. [x] Other: Increased to 90% BW on AlterG. Pt doing well at 90% but noticably more fatigued. Cues for decreased lateral sway and for getting to big toe to push off in gait. STG 10 visits:  1. Normal hip extension ROM to allow for improved glute activation  2. 5/5 L hip strength to normalize walking gait  3. Pt able to walk with normal gait quality community distances without pain  4. Pt able to hop painfree with proper landing mechanics when cleared by physician to prepare for the impact of running      LTG 20 visits:  1. Pt able to run 1 mile painfree with acceptable mechanics to reduce loading rates and understand proper progression for getting back IND to a normal training load     Pt. Education:  [x] Yes  [] No  [] Reviewed Prior HEP/Ed  Method of Education: [x] Verbal  [] Demo  [] Written  Comprehension of Education:  [x] Verbalizes understanding. [] Demonstrates understanding. [] Needs review. [] Demonstrates/verbalizes HEP/Ed previously given. Plan: [x] Continue per plan of care.    [] Other:      Time In: 0840          Time Out: 0930    Electronically signed by:  Luciana Murphy, PT

## 2020-03-09 ENCOUNTER — HOSPITAL ENCOUNTER (OUTPATIENT)
Dept: PHYSICAL THERAPY | Facility: CLINIC | Age: 40
Setting detail: THERAPIES SERIES
Discharge: HOME | End: 2020-03-09
Payer: COMMERCIAL

## 2020-03-09 PROCEDURE — 97110 THERAPEUTIC EXERCISES: CPT

## 2020-03-09 PROCEDURE — 97140 MANUAL THERAPY 1/> REGIONS: CPT

## 2020-03-09 NOTE — FLOWSHEET NOTE
[] [facility]) - [facility] &  Therapy  [address]  [phone]  F: [phone] [] [address]  [facility]   [address]  P: [phone]  F: [phone] [x] [address] &  Therapy  [address] [facility]  P: [phone]  F: [phone] [] [address]  [facility]   [address]   [place]: [phone]  F: [phone]      Physical Therapy Daily Treatment Note    Date:  3/9/2020  Patient Name:  Hugh Taylor    :  1980  MRN: [id number]  Physician: Dr. Dania Dumont: Mindi Graham  Medical Diagnosis: L pubic ramus fx and L acetabular fx                    Rehab Codes:S76.312A   Onset Date: 19                 Next Dr. Steve Garcia: 20  Visit# / total visits: 10/20  Cancels/No Shows: 0/0    Subjective:    Pain:  [] Yes  [] No Location: L hip  Pain Rating: (0-10 scale) 3/10  Pain altered Tx:  [x] No  [] Yes  Action:  Comments:  Really sore over the weekend. My kid had a swim meet and sitting at that 5 hrs per day made my L LB and buttock hurt. Objective:  Manual:  DI to L glute med, L QL, Lpiriformis, L hip flexor prox and distal  Precautions: gentle core and hip strengthening.  Healing stress fracture of L acetabulum and pubic ramus  Exercises:  Exercise Reps/ Time Weight/ Level Issued for HEP   Comments   B Gastroc andSoleus s 2'      x     Prone             Flying squirrels             Hip ext (glut max)  2x10   x    2 pillows to neutral hip ext    Hip abd  2x10   x  -     Supine             ADIM walkouts w/ TB UE pulldown 2x10  Grey x      ADIM leg fall outs 2x10   x  hooklying   ADIM with alt UE 2X10 2# x      Hip flexor s  2'           Bridges             1 legged bridges             Posture bridge /march  x20    x  x     Sidelying             Clams 90/30 deg 2x10   x  -     Bere hip abd Out: 1115    Electronically signed by:  Luciana Murphy, PT

## 2020-03-17 ENCOUNTER — HOSPITAL ENCOUNTER (OUTPATIENT)
Dept: PHYSICAL THERAPY | Facility: CLINIC | Age: 40
Setting detail: THERAPIES SERIES
Discharge: HOME | End: 2020-03-17
Payer: COMMERCIAL

## 2020-03-17 PROCEDURE — 97140 MANUAL THERAPY 1/> REGIONS: CPT

## 2020-03-17 PROCEDURE — 97110 THERAPEUTIC EXERCISES: CPT

## 2020-03-17 NOTE — FLOWSHEET NOTE
Monster walks  3 laps  Blue  x   feet                Step ups  x10  6\"  x   Posterior and lateral   Quadruped firehydrant 2x10  x     Posture bridge w/march 2sets   x    Hip thrust x20   x    MOBO        Foot rocks x30   x    Star squat x5   x    Single leg deadlift 2x5   x                                         Treatment Charges: Mins Units   []  Modalities     [x]  Ther Exercise 30 2   [x]  Manual Therapy 20 1   []  Ther Activities     []  Aquatics     []  Vasocompression     []  Other     Total Treatment time 50 3       Assessment: [] Progressing toward goals. [] No change. [x] Other: Added MOBO to try to get improved stability and activation of foot intrinsics while activating glutes. Pt demonstrated poor L LE mechanics with genu valgus due to deviations in the frontal as well as the transverse plane. Fatigued quickly w/ single leg deadlift. STG 10 visits:  1. Normal hip extension ROM to allow for improved glute activation  2. 5/5 L hip strength to normalize walking gait  3. Pt able to walk with normal gait quality community distances without pain  4. Pt able to hop painfree with proper landing mechanics when cleared by physician to prepare for the impact of running      LTG 20 visits:  1. Pt able to run 1 mile painfree with acceptable mechanics to reduce loading rates and understand proper progression for getting back IND to a normal training load     Pt. Education:  [x] Yes  [] No  [] Reviewed Prior HEP/Ed  Method of Education: [x] Verbal  [] Demo  [] Written  Comprehension of Education:  [x] Verbalizes understanding. [] Demonstrates understanding. [] Needs review. [] Demonstrates/verbalizes HEP/Ed previously given. Plan: [x] Continue per plan of care.    [] Other:      Time In: 0805          Time Out: 0900    Electronically signed by:  Geraldo Blank, PT

## 2020-03-24 ENCOUNTER — APPOINTMENT (OUTPATIENT)
Dept: PHYSICAL THERAPY | Facility: CLINIC | Age: 40
End: 2020-03-24
Payer: COMMERCIAL

## 2020-03-31 ENCOUNTER — APPOINTMENT (OUTPATIENT)
Dept: PHYSICAL THERAPY | Facility: CLINIC | Age: 40
End: 2020-03-31
Payer: COMMERCIAL

## 2020-06-01 ENCOUNTER — HOSPITAL ENCOUNTER (OUTPATIENT)
Dept: PHYSICAL THERAPY | Facility: CLINIC | Age: 40
Setting detail: THERAPIES SERIES
Discharge: HOME | End: 2020-06-01
Payer: COMMERCIAL

## 2020-06-01 PROCEDURE — 97110 THERAPEUTIC EXERCISES: CPT

## 2020-06-01 PROCEDURE — 97140 MANUAL THERAPY 1/> REGIONS: CPT

## 2020-06-09 ENCOUNTER — HOSPITAL ENCOUNTER (OUTPATIENT)
Dept: PHYSICAL THERAPY | Facility: CLINIC | Age: 40
Setting detail: THERAPIES SERIES
Discharge: HOME | End: 2020-06-09
Payer: COMMERCIAL

## 2020-06-09 PROCEDURE — 97110 THERAPEUTIC EXERCISES: CPT

## 2020-06-09 PROCEDURE — 97140 MANUAL THERAPY 1/> REGIONS: CPT

## 2020-06-09 NOTE — FLOWSHEET NOTE
Education:  [x] Verbalizes understanding. [] Demonstrates understanding. [] Needs review. [] Demonstrates/verbalizes HEP/Ed previously given. Plan: [x] Continue per plan of care.    [] Other:      Time In: 1465       Time Out: 8961    Electronically signed by:  Barber Gutierrez PT

## 2020-06-15 ENCOUNTER — HOSPITAL ENCOUNTER (OUTPATIENT)
Dept: PHYSICAL THERAPY | Facility: CLINIC | Age: 40
Setting detail: THERAPIES SERIES
Discharge: HOME | End: 2020-06-15
Payer: COMMERCIAL

## 2020-06-15 PROCEDURE — 97110 THERAPEUTIC EXERCISES: CPT

## 2020-06-15 PROCEDURE — 97140 MANUAL THERAPY 1/> REGIONS: CPT

## 2020-06-15 NOTE — FLOWSHEET NOTE
lateral   Quadruped firehydrant 2x10  x     Posture bridge w/march 2sets       Hip thrust x20       MOBO        Foot rocks x30  FINS 2/4 x B   KB pass x20 18#,13#  x B   SL stand w/opp Hip abd/ER 2x10 Orange @ knees  x B   Star squat x5       Single leg deadlift 2x5       HR x30  FINS 1/4     Powestride step over x30  FINS 1/4     Tippy bird w/lat pull 2x10 blue      D2 w SL stand 2sets black   TT at #1   Horizontal abd w/SL stand 2 sets black      Plank with MOBO rock side to side 1 set    Cue to not deviate hips in transverse plane   PLYO        Impact lunge fwd 2x15   x    skaters 2x20   x     Next Visit: Continue impact    Treatment Charges: Mins Units   []  Modalities     [x]  Ther Exercise 45 3   [x]  Manual Therapy 15 1   []  Ther Activities     []  Aquatics     []  Vasocompression     []  Other     Total Treatment time 60 4       Assessment: [] Progressing toward goals. [] No change. [x] Other: L Glute spasms responding more quickly. No need for mobilization to gain full hip extension this date. ER full after manual as well. Began jumping for readiness to run. Limb stiffness noted on L vs R as well as quicker to fatigue. Able to correct and hinge hip with improved technique after cue but again quick fatigue with loss of form in frontal and transverse plane. STG 10 visits:  1. Normal hip extension ROM to allow for improved glute activation  2. 5/5 L hip strength to normalize walking gait  3. Pt able to walk with normal gait quality community distances without pain  4. Pt able to hop painfree with proper landing mechanics when cleared by physician to prepare for the impact of running      LTG 20 visits:  1. Pt able to run 1 mile painfree with acceptable mechanics to reduce loading rates and understand proper progression for getting back IND to a normal training load     Pt.  Education:  [x] Yes  [] No  [] Reviewed Prior HEP/Ed  Method of Education: [x] Verbal  [] Demo  [] Written  Comprehension of Education:  [x] Verbalizes understanding. [] Demonstrates understanding. [] Needs review. [] Demonstrates/verbalizes HEP/Ed previously given. Plan: [x] Continue per plan of care.    [] Other:      Time In: 1493       Time Out: 1505    Electronically signed by:  Sana Munroe PT

## 2020-06-23 ENCOUNTER — HOSPITAL ENCOUNTER (OUTPATIENT)
Dept: PHYSICAL THERAPY | Facility: CLINIC | Age: 40
Setting detail: THERAPIES SERIES
Discharge: HOME | End: 2020-06-23
Payer: COMMERCIAL

## 2020-06-23 PROCEDURE — 97140 MANUAL THERAPY 1/> REGIONS: CPT

## 2020-06-23 PROCEDURE — 97110 THERAPEUTIC EXERCISES: CPT

## 2020-06-23 NOTE — FLOWSHEET NOTE
[] [facility]) - [facility] &  Therapy  [address]  [phone]  F: [phone] [] [address]  [place] [address]   [address]  P: [phone]  F: [phone] [x] [address] &  Therapy  [address] [facility]  P: [phone]  F: [phone] [] [address]  [facility]   [address]   [place]: [phone]  F: [phone]      Physical Therapy Daily Treatment Note    Date:  2020  Patient Name:  Zain Simmons    :  1980  MRN: [id number]  Physician: Dr. Garcia Jurist: Damien Murphy  Medical Diagnosis: L pubic ramus fx and L acetabular fx                    Rehab Codes:S76.312A   Onset Date: 19                 Next Dr. Carina Powellck: 20  Visit# / total visits: 15/20  Cancels/No Shows: 0/0    Subjective:    Pain:  [] Yes  [] No Location: L hip  Pain Rating: (0-10 scale) 0/10  Pain altered Tx:  [x] No  [] Yes  Action:  Comments:  Did well with jumping.  Had no pain but my back does feel tighter    Objective:  Manual:  DI to L glute med, Lpiriformis, L hip flexor prox , L QL, Hypervolt to L TFL and rectus femoris  Precautions: standard  Exercises:  Exercise Reps/ Time Weight/ Level Issued for HEP   Comments   L Gastroc stretch 2'      x     Prone             Flying squirrels             Hip ext (glut max)  2x10   x    2 pillows to neutral hip ext    Hip abd  2x10   x  -     Supine             ADIM walkouts w/ TB UE pulldown 2x10  Grey x      ADIM leg fall outs 2x10   x  hooklying   ADIM with alt UE 2X10 2# x      Hip flexor s  2'           Bridges             1 legged bridges             Posture bridge w/march  x20    x       Sidelying             Clams 90/30 deg 2x10   x  -     Bere hip abd  2x10   x       Gym             Heel tap-3 direction x10ea  4\"  x   limited ROM   Monster walks  3 laps  Blue  x   Verbalizes understanding. [] Demonstrates understanding. [] Needs review. [] Demonstrates/verbalizes HEP/Ed previously given. Plan: [x] Continue per plan of care.    [] Other:      Time In: 5772       Time Out: 1005    Electronically signed by:  Brie Goncalves, PT

## 2020-06-30 ENCOUNTER — HOSPITAL ENCOUNTER (OUTPATIENT)
Dept: PHYSICAL THERAPY | Facility: CLINIC | Age: 40
Setting detail: THERAPIES SERIES
Discharge: HOME | End: 2020-06-30
Payer: COMMERCIAL

## 2020-06-30 PROCEDURE — 9900000073 HC MANUAL THERAPY PER 15 MIN (SELF-PAY)

## 2020-06-30 PROCEDURE — 9900000067 HC THERAPEUTIC EXERCISE EA 15 MINS (SELF-PAY)

## 2020-06-30 NOTE — FLOWSHEET NOTE
feet                Step ups  x10  6\"  x   Posterior and lateral   Quadruped firehydrant 2x10  x     Posture bridge w/march 2sets       Hip thrust x20       MOBO        Foot rocks x30  FINS 2/4 x B   KB pass x20 18#  x B   SL stand w/opp Hip abd/ER 2x10 Orange @ knees  x B   Star squat x5       Single leg deadlift 2x5       HR x30  FINS 1/4 x    Powestride w overhead reach x30 4# FINS 1/4 x    Tippy bird w/lat pull 2x10 blue      D2 w SL stand 2sets black   TT at #1   Horizontal abd w/SL stand 2 sets black      Plank with MOBO rock side to side 1 set    Cue to not deviate hips in transverse plane   AlterG 85%BW       TM run/walk 3on/2offx3 cycles 6.2/2.5  x 5 min walk PRAKASH           PLYO        Impact lunge fwd/45/lat 2x15       skaters 2x20        Next Visit: Running on AlterG    Treatment Charges: Mins Units   []  Modalities     [x]  Ther Exercise 35 2   [x]  Manual Therapy 15 1   []  Ther Activities     []  Aquatics     []  Vasocompression     []  Other     Total Treatment time 50 3       Assessment: [] Progressing toward goals. [] No change. [x] Other:Pt ran in [facility] without complaints of increased symptoms. She is stiff at trunk and requires cues to lift heel at toe off during run. Pt is to RTW this week as well. Assess for any increase in symtpoms and adjust appropriately  STG 10 visits:  1. Normal hip extension ROM to allow for improved glute activation  2. 5/5 L hip strength to normalize walking gait  3. Pt able to walk with normal gait quality community distances without pain  4. Pt able to hop painfree with proper landing mechanics when cleared by physician to prepare for the impact of running      LTG 20 visits:  1. Pt able to run 1 mile painfree with acceptable mechanics to reduce loading rates and understand proper progression for getting back IND to a normal training load     Pt.  Education:  [x] Yes  [] No  [] Reviewed Prior HEP/Ed  Method of Education: [x] Verbal  [] Demo  [] Written  Comprehension of Education:  [x] Verbalizes understanding. [] Demonstrates understanding. [] Needs review. [] Demonstrates/verbalizes HEP/Ed previously given. Plan: [x] Continue per plan of care.    [] Other:      Time In: 7407       Time Out: 0320    Electronically signed by:  Aayush Chandra PT

## 2020-07-07 ENCOUNTER — HOSPITAL ENCOUNTER (OUTPATIENT)
Dept: PHYSICAL THERAPY | Facility: CLINIC | Age: 40
Setting detail: THERAPIES SERIES
Discharge: HOME | End: 2020-07-07
Payer: COMMERCIAL

## 2020-07-07 PROCEDURE — 97140 MANUAL THERAPY 1/> REGIONS: CPT

## 2020-07-07 PROCEDURE — 97110 THERAPEUTIC EXERCISES: CPT

## 2020-07-07 NOTE — FLOWSHEET NOTE
[] [facility]) - [facility] &  Therapy  [address]  [phone]  F: [phone] [] [address]  [place] [address]   [address]  P: [phone]  F: [phone] [x] [address] &  Therapy  [address] [facility]  P: [phone]  F: [phone] [] [address]  [facility]   [address]   [place]: [phone]  F: [phone]      Physical Therapy Daily Treatment Note    Date:  2020  Patient Name:  Joey James    :  1980  MRN: [id number]  Phys[facility] Ankita Alber[facility]  Medical Diagnosis: L pubic ramus fx and L acetabular fx                    Rehab Codes:S76.312A   Onset Date: 19                 Next Dr. Haroldo Gaona: 20  Visit# / total visits:   Cancels/No Shows: 0/0    Subjective:    Pain:  [] Yes  [] No Location: L hip  Pain Rating: (0-10 scale) 0/10  Pain altered Tx:  [x] No  [] Yes  Action:  Comments: Doing well. Felt fatigued after 1st run but no pain. Travelled to [facility] this weekend and just felt LB tightness and was able to get rid of that with some heat.     Objective:  Manual:  DI to L glute med, Lpiriformis, L hip flexor prox , L QL, Hypervolt to L TFL and rectus femoris  Precautions: standard  Exercises:  Exercise Reps/ Time Weight/ Level Issued for HEP   Comments   L Gastroc stretch 2'           Prone             Flying squirrels             Hip ext (glut max)  2x10   x    2 pillows to neutral hip ext    Hip abd  2x10   x  -     Supine             ADIM walkouts w/ TB UE pulldown 2x10  Grey x      ADIM leg fall outs 2x10   x  hooklying   ADIM with alt UE 2X10 2# x      Hip flexor s  2'           Bridges             1 legged bridges             Posture bridge /march  x20    x       Sidelying             Clams 90/30 deg 2x10   x  -     Bere hip abd  2x10   x       Gym             Heel tap-3 direction x10ea  4\"  x   limited ROM   Monster walks  3 laps  Blue  x   feet                Step ups  x10  6\"  x   Posterior and lateral   Quadruped firehydrant 2x10  x     Posture bridge w/march 2sets       Hip thrust x20       MOBO        Foot rocks x30  FINS 2/4  B   KB pass x20 18#   B   SL stand w/opp Hip abd/ER 2x10 [place] @ knees   B   Star squat x5       Single leg deadlift 2x5       HR x30  FINS 1/4 x    Powestride w overhead reach x30 4# FINS 1/4 x    Tippy bird w/lat pull 2x10 blue      D2 w SL stand 2sets black   TT at #1   Horizontal abd w/SL stand 2 sets black      Plank with MOBO rock side to side 1 set    Cue to not deviate hips in transverse plane   AlterG 85%BW       TM run/walk 4on/2offx5 cycles 6.3/2.5  x 5 min walk PRAKASH           PLYO        Impact lunge fwd/45/lat 2x15       skaters 2x20        Next Visit: Increase to 90% BW and 2x/week to ramp up readiness for Return to run FWB    Treatment Charges: Mins Units   []  Modalities     [x]  Ther Exercise 35 2   [x]  Manual Therapy 15 1   []  Ther Activities     []  Aquatics     []  Vasocompression     []  Other     Total Treatment time 50 3       Assessment: [] Progressing toward goals. [] No change. [x] Other:Pt ran in [place] without complaints with increased running time same rest. More able to maintain 178spm on castro with cue to relax shoulders and bring ribs down with decreased transverse plane motion noted out of trunk with that cue. STG 10 visits:  1. Normal hip extension ROM to allow for improved glute activation  2. 5/5 L hip strength to normalize walking gait  3. Pt able to walk with normal gait quality community distances without pain  4. Pt able to hop painfree with proper landing mechanics when cleared by physician to prepare for the impact of running      LTG 20 visits:  1.  Pt able to run 1 mile painfree with acceptable mechanics to reduce loading rates and understand proper progression for getting back IND to a normal training load     Pt. Education:  [x] Yes  [] No  [] Reviewed Prior HEP/Ed  Method of Education: [x] Verbal  [] Demo  [] Written  Comprehension of Education:  [x] Verbalizes understanding. [] Demonstrates understanding. [] Needs review. [] Demonstrates/verbalizes HEP/Ed previously given. Plan: [x] Continue per plan of care.    [] Other:      Time In: 0170       Time Out: 7297    Electronically signed by:  Eduardo Oakley, PT

## 2020-07-14 ENCOUNTER — HOSPITAL ENCOUNTER (OUTPATIENT)
Dept: PHYSICAL THERAPY | Facility: CLINIC | Age: 40
Setting detail: THERAPIES SERIES
Discharge: HOME | End: 2020-07-14
Payer: COMMERCIAL

## 2020-07-14 PROCEDURE — 95992 CANALITH REPOSITIONING PROC: CPT

## 2020-07-14 PROCEDURE — 97140 MANUAL THERAPY 1/> REGIONS: CPT

## 2020-07-14 PROCEDURE — 97110 THERAPEUTIC EXERCISES: CPT

## 2020-07-14 NOTE — FLOWSHEET NOTE
G.  STG 10 visits:  1. Normal hip extension ROM to allow for improved glute activation  2. 5/5 L hip strength to normalize walking gait  3. Pt able to walk with normal gait quality community distances without pain  4. Pt able to hop painfree with proper landing mechanics when cleared by physician to prepare for the impact of running      LTG 20 visits:  1. Pt able to run 1 mile painfree with acceptable mechanics to reduce loading rates and understand proper progression for getting back IND to a normal training load     Pt. Education:  [x] Yes  [] No  [] Reviewed Prior HEP/Ed  Method of Education: [x] Verbal  [] Demo  [] Written  Comprehension of Education:  [x] Verbalizes understanding. [] Demonstrates understanding. [] Needs review. [] Demonstrates/verbalizes HEP/Ed previously given. Plan: [x] Continue per plan of care.    [] Other:      Time In: 1210       Time Out: 1300    Electronically signed by:  Lizzeth Jimenes PT

## 2020-07-17 ENCOUNTER — HOSPITAL ENCOUNTER (OUTPATIENT)
Dept: PHYSICAL THERAPY | Facility: CLINIC | Age: 40
Setting detail: THERAPIES SERIES
Discharge: HOME | End: 2020-07-17
Payer: COMMERCIAL

## 2020-07-17 PROCEDURE — 97110 THERAPEUTIC EXERCISES: CPT

## 2020-07-17 NOTE — FLOWSHEET NOTE
[] [facility]) - [facility] &  Therapy  [address]  [phone]  F: [phone] [] [address]  [place] [address]   [address]  P: [phone]  F: [phone] [x] [address] &  Therapy  [address] [facility]  P: [phone]  F: [phone] [] [address]  [facility]   [address]   [place]: [phone]  F: [phone]      Physical Therapy Daily Treatment Note    Date:  2020  Patient Name:  Garret Nur    :  1980  MRN: [id number]  Physician: Dr. Liu Esposito: Nito Lares  Medical Diagnosis: L pubic ramus fx and L acetabular fx                    Rehab Codes:S76.312A   Onset Date: 19                 Next Dr. Get Stoddard: 20  Visit# / total visits:   Cancels/No Shows: 0/0    Subjective:    Pain:  [] Yes  [] No Location: L hip  Pain Rating: (0-10 scale) 1-2/10- more tight than anything  Pain altered Tx:  [x] No  [] Yes  Action:  Comments: subjectively, she reports L piriformis tighness and needing to stretch. Reports that she has repeatedly tried to the stretch the leg.     Objective:  Manual:  MET to correct L upslip, DI to L glute med, Lpiriformis, L hip flexor prox , L QL, Hypervolt to L TFL and rectus femoris--none  Precautions: standard  Exercises:  Exercise Reps/ Time Weight/ Level Issued for HEP   Comments   Burrito calf stretch 2'           1/2 kneel hip flexor stretch w/ knee flexed 2'       Prone             Flying squirrels  20         Hip ER 2 sert        Hip ext (glut max)  2x10   x    2 pillows to neutral hip ext    Hip abd  2x10   x  -     Supine             ADIM walkouts w/ TB UE pulldown 2x10  Grey x      ADIM leg fall outs 2x10   x  hooklying   ADIM with alt UE 2X10 2# x      Hip flexor s  2'           Bridges             1 legged bridges             Posture bridge w/march  x20    x       Sidelying             Clams 90/30 deg 2x10   x  -     Bere hip abd  2x10   x       Gym                             Heel tap-3 direction x10ea  4\"  x   limited ROM   Monster walks  3 laps  Blue  x   feet                Step ups  x10  6\"  x   Posterior and lateral   Quadruped firehydrant 2x10  x     Posture bridge w/march 2sets       Hip thrust x20       MOBO        Foot rocks x30  FINS 2/4  B   KB pass x20 18#   B   SL stand w/opp Hip abd/ER 2x10 Orange    B@ knees   Star squat x5       Single leg deadlift 2x5       HR x30  FINS 1/4     Powestride w overhead reach x30 4# FINS 1/4     Tippy bird w/lat pull 2x10 blue      D2 w SL stand 2sets black   TT at #1   Horizontal abd w/SL stand 2 sets black      Plank with MOBO rock side to side 1 set    Cue to not deviate hips in transverse plane   AlterG 90%BW       TM run/walk [id number] 6.3/2.5  x 5 min walk PRAKASH           PLYO        Impact lunge fwd/45/lat 2x15       skaters 2x20        Next Visit: Remain at 90% BW and 2x/week to ramp up readiness for Return to run FWB     Hand held dynomometer (make protocol) placed on medial mallelous with pt in prone and knee flexed to 90 deg  Hip ER (neutral)   R 17.8/17.9/17.0 ave: 17.6   L:16.2/14.2/16.3 15.6 Difference 2.0 lbs (12%)    Hip ER ( in 45 deg of ER)    R:14.7/13.2/13.4 ave 16.4  L: 9.6/9.0/8.8 ave ave 9.1 Difference 7.3 lbs (45%)     Treatment Charges: Mins Units   []  Modalities     [x]  Ther Exercise 50 3   []  Manual Therapy     []  Ther Activities     []  Aquatics     []  Vasocompression     []  Other     Total Treatment time 50 3       Assessment: [] Progressing toward goals. [] No change. [x] Other:pt presents with  Subjective complaint of L piriformis achiness and is not able to stretch it after multiple attempts. She demonstrates full ROM of hip ext, IR and ER. She was able to increase to 90% WB on Alter G without issue.   Run at 90% and then progress to FWB on treadmill. She reported understanding with discussion that piriformis strength-length curve and need to terminate piriformis stretching. STG 10 visits:  1. Normal hip extension ROM to allow for improved glute activation  2. 5/5 L hip strength to normalize walking gait  3. Pt able to walk with normal gait quality community distances without pain  4. Pt able to hop painfree with proper landing mechanics when cleared by physician to prepare for the impact of running      LTG 20 visits:  1. Pt able to run 1 mile painfree with acceptable mechanics to reduce loading rates and understand proper progression for getting back IND to a normal training load     Pt. Education:  [x] Yes  [] No  [] Reviewed Prior HEP/Ed  Method of Education: [x] Verbal  [] Demo  [] Written  Comprehension of Education:  [x] Verbalizes understanding. [] Demonstrates understanding. [] Needs review. [] Demonstrates/verbalizes HEP/Ed previously given. Plan: [x] Continue per plan of care.    [] Other:      Time In: 1303Time Out: 620 [facility]    Electronically signed by:  Jenny Jarquin, PT

## 2020-07-21 ENCOUNTER — HOSPITAL ENCOUNTER (OUTPATIENT)
Dept: PHYSICAL THERAPY | Facility: CLINIC | Age: 40
Setting detail: THERAPIES SERIES
Discharge: HOME | End: 2020-07-21
Payer: COMMERCIAL

## 2020-07-21 PROCEDURE — 97110 THERAPEUTIC EXERCISES: CPT

## 2020-07-21 NOTE — FLOWSHEET NOTE
[] [facility]) - [facility] &  Therapy  [address]  [phone]  F: [phone] [] [address]  [facility]   [address]  P: [phone]  F: [phone] [x] [address] &  Therapy  [address] [facility]  P: [phone]  F: [phone] [] [address]  [facility]   [address]   [place]: [phone]  F: [phone]      Physical Therapy Daily Treatment Note    Date:  2020  Patient Name:  Jannie Phillips    :  1980  MRN: [id number]  Physician: Dr. Khan Section: Sherri Pinedo  Medical Diagnosis: L pubic ramus fx and L acetabular fx                    Rehab Codes:S76.312A   Onset Date: 19                 Next Dr. eBrrios Crystal: 20  Visit# / total visits:   Cancels/No Shows: 0/0    Subjective:    Pain:  [] Yes  [] No Location: L hip  Pain Rating: (0-10 scale) 1-2/10- more tight than anything  Pain altered Tx:  [x] No  [] Yes  Action:  Comments: no issues with leg after last visit. Has not stretched the leg and also not had to work. She will work tomorrow night.   Requests no need for manual.       Objective:  Manual:  MET to correct L upslip, DI to L glute med, Lpiriformis, L hip flexor prox , L QL, Hypervolt to L TFL and rectus femoris--none  Precautions: standard  Exercises:  Exercise Reps/ Time Weight/ Level Issued for HEP   Comments   Burrito calf stretch 2'           1/2 kneel hip flexor stretch w/ knee flexed 2'       Prone             Flying squirrels  20         Hip ER 2 sert        Hip ext (glut max)  2x10   x    2 pillows to neutral hip ext    Hip abd  2x10   x  -     Supine             ADIM walkouts w/ TB UE pulldown 2x10  Grey x      ADIM leg fall outs 2x10   x  hooklying   ADIM with alt UE 2X10 2# x      Hip flexor s  2'           Bridges             1 legged bridges             Posture bridge w/march  x20    x       Sidelying             Clams 90/30 deg 2x10   x  -     Bere hip abd  2x10   x       Gym                             Heel tap-3 direction x10ea  4\"  x   limited ROM   Monster walks  3 laps  Blue  x   feet                Step ups  x10  6\"  x   Posterior and lateral   Quadruped firehydrant 2x10  x     Posture bridge w/march 2sets       Hip thrust x20       MOBO        Foot rocks x30  FINS 2/4  B   KB pass x20 18#   B   SL stand w/opp Hip abd/ER 2x10 Orange    B@ knees   Star squat x5       Single leg deadlift 2x5       HR x30  FINS 1/4     Powestride w overhead reach x30 4# FINS 1/4     Tippy bird w/lat pull 2x10 blue      D2 w SL stand 2sets black   TT at #1   Horizontal abd w/SL stand 2 sets black      Plank with MOBO rock side to side 1 set    Cue to not deviate hips in transverse plane   TM run/walk  (treadmill) [id number] 5.7/2.5  x 5 min walk PRAKASH           PLYO        Impact lunge fwd/45/lat 2x15       skaters 2x20        Next Visit: She can run every other day with 4/2' x 6 cycles at 5.7/2.5 mph     Hand held dynomometer (make protocol) placed on medial mallelous with pt in prone and knee flexed to 90 deg  7/17/20  Hip ER (neutral)   R 17.8/17.9/17.0 ave: 17.6   L:16.2/14.2/16.3 15.6 Difference 2.0 lbs (12%)    Hip ER ( in 45 deg of ER)    R:14.7/13.2/13.4 ave 16.4  L: 9.6/9.0/8.8 ave ave 9.1 Difference 7.3 lbs (45%)      7/21/20  L 13.0/13.7/14.6 ave: 13.8 Difference 2.0 (12.5%)   R 15.2/16.7/16.2 ave: 16.0     Treatment Charges: Mins Units   []  Modalities     [x]  Ther Exercise 45 3   []  Manual Therapy     []  Ther Activities     []  Aquatics     []  Vasocompression     []  Other     Total Treatment time 45 3       Assessment: [] Progressing toward goals. [] No change. [x] Other:pt presents with  Her strength with hip ER at 45 deg of ER has improved from 9.1 to 13.8 lbs and a difference of 45% to 12.5%.   She was able to advance to the treadmill at 100% WB. Decreased speed from 6.3 to 5.7 mph due to increased effort. STG 10 visits:  1. Normal hip extension ROM to allow for improved glute activation  2. 5/5 L hip strength to normalize walking gait  3. Pt able to walk with normal gait quality community distances without pain  4. Pt able to hop painfree with proper landing mechanics when cleared by physician to prepare for the impact of running      LTG 20 visits:  1. Pt able to run 1 mile painfree with acceptable mechanics to reduce loading rates and understand proper progression for getting back IND to a normal training load     Pt. Education:  [x] Yes  [] No  [] Reviewed Prior HEP/Ed  Method of Education: [x] Verbal  [] Demo  [] Written  Comprehension of Education:  [x] Verbalizes understanding. [] Demonstrates understanding. [] Needs review. [] Demonstrates/verbalizes HEP/Ed previously given. Plan: [x] Continue per plan of care.    [] Other:      Time In: 5111  Time Out: 1300  Electronically signed by:  Alicia De Leon, PT

## 2020-07-24 ENCOUNTER — APPOINTMENT (OUTPATIENT)
Dept: PHYSICAL THERAPY | Facility: CLINIC | Age: 40
End: 2020-07-24
Payer: COMMERCIAL

## 2020-07-28 ENCOUNTER — HOSPITAL ENCOUNTER (OUTPATIENT)
Dept: PHYSICAL THERAPY | Facility: CLINIC | Age: 40
Setting detail: THERAPIES SERIES
Discharge: HOME | End: 2020-07-28
Payer: COMMERCIAL

## 2020-07-28 PROCEDURE — 97110 THERAPEUTIC EXERCISES: CPT

## 2020-07-28 NOTE — PROGRESS NOTES
[] [facility]) - [facility] &  Therapy  [address]  [phone]  F: [phone] [] [address]  [place] [address]   [address]  P: [phone]  F: [phone] [x] [address] &  Therapy  [address]  P: [phone]  F: [phone] [] [address]  [facility]   [address]   [place]: [phone]  F: [phone]      Physical Therapy Daily Treatment Note    Date:  2020  Patient Name:  Juan Pablo Salter    :  1980  MRN: [id number]  Physician: Dr. Chay Hearn: Harvey Guajardo  Medical Diagnosis: L pubic ramus fx and L acetabular fx                  Rehab Codes:S76.312A   Onset Date: 19                 Next Dr. Cuco Landry: 20  Visit# / total visits:    Cancels/No Shows: 0/0    Subjective:    Pain:  [] Yes  [] No Location: L hip  Pain Rating: (0-10 scale) 1-2/10- more tight than anything  Pain altered Tx:  [x] No  [] Yes  Action:  Comments: she reports the L buttock is feeling much better. No need to stretch. No deep ache. She is to have an ergo consult tomorrow. She is also sitting less.       Objective:  Manual:  none  Precautions: standard  Exercises:  Exercise Reps/ Time Weight/ Level Issued for HEP   Comments   Burrito calf stretch 2'           1/2 kneel hip flexor stretch w/ knee flexed 2'       Prone             Flying squirrels  20   /      Hip ER 2 sert        Hip ext (glut max)  2x10   x    2 pillows to neutral hip ext    Hip abd  2x10   x  -     Supine             ADIM walkouts w/ TB UE pulldown 2x10  Grey x      ADIM leg fall outs 2x10   x  hooklying   ADIM with alt UE 2X10 2# x      Hip flexor s  2'           Bridges             1 legged bridges             Posture bridge w/march  x20    x       Sidelying             Clams 90/30 deg 2x10   x  -     Bere hip abd  2x10 min consecutive. a speed of 5.7 mph is appropriate as an \"easy pace\". Manually confirmed that her castro was 180 spm    STG 10 visits:  1. Normal hip extension ROM to allow for improved glute activation  2. 5/5 L hip strength to normalize walking gait  3. Pt able to walk with normal gait quality community distances without pain  4. Pt able to hop painfree with proper landing mechanics when cleared by physician to prepare for the impact of running      LTG 20 visits:  1. Pt able to run 1 mile painfree with acceptable mechanics to reduce loading rates and understand proper progression for getting back IND to a normal training load     Pt. Education:  [x] Yes  [] No  [] Reviewed Prior HEP/Ed  Method of Education: [x] Verbal  [] Demo  [] Written  Comprehension of Education:  [x] Verbalizes understanding. [] Demonstrates understanding. [] Needs review. [] Demonstrates/verbalizes HEP/Ed previously given. Plan: [x] Continue per plan of care.    [] Other:        Time In:1500  Time Out: 7916  Electronically signed by:  Riya Edwards, PT

## 2020-08-04 ENCOUNTER — HOSPITAL ENCOUNTER (OUTPATIENT)
Dept: PHYSICAL THERAPY | Facility: CLINIC | Age: 40
Setting detail: THERAPIES SERIES
Discharge: HOME | End: 2020-08-04
Payer: COMMERCIAL

## 2020-08-04 PROCEDURE — 97110 THERAPEUTIC EXERCISES: CPT

## 2020-08-04 NOTE — PROGRESS NOTES
[] [facility]) - [facility] &  Therapy  [address]  [phone]  F: [phone] [] [address]  [place] [address]   [address]  P: [phone]  F: [phone] [x] [address] &  Therapy  [address] [facility]  P: [phone]  F: [phone] [] [address]  [facility]   [address]   [place]: [phone]  F: [phone]      Physical Therapy Daily Treatment Note    Date:  2020  Patient Name:  Jean Pierre Emmanuel    :  1980  MRN: [id number]  Physician: Dr. Sita Graves: Jean-Paul Epstein  Medical Diagnosis: L pubic ramus fx and L acetabular fx                  Rehab Codes:S76.312A   Onset Date: 19                 Next Dr. Nails Shear: 20  Visit# / total visits:    Cancels/No Shows: 0/0    Subjective:    Pain:  [] Yes  [] No Location: L hip  Pain Rating: (0-10 scale) 1-2/10- more tight than anything  Pain altered Tx:  [x] No  [] Yes  Action:  Comments: she reports the L buttock is feeling much better. No need to stretch. No deep ache. She is to have an ergo consult tomorrow. She is also sitting less.       Objective:  Manual:  none  Precautions: standard  Exercises:  Exercise Reps/ Time Weight/ Level Issued for HEP   Comments   Burrito calf stretch 2'           1/2 kneel hip flexor stretch w/ knee flexed 2'       Prone             Flying squirrels  20   7/17      Hip ER 2 sert        Hip ext (glut max)  2x10   x    2 pillows to neutral hip ext    Hip abd  2x10   x  -     Supine             ADIM walkouts w/ TB UE pulldown 2x10  Grey x      ADIM leg fall outs 2x10   x  hooklying   ADIM with alt UE 2X10 2# x      Hip flexor s  2'           Bridges             1 legged bridges             Posture bridge w/march  x20    x       Sidelying             Clams 90/30 deg 2x10   x  -     Bere hip abd  2x10   consecutive. a speed of 5.7 mph is appropriate as an \"easy pace\". Manually confirmed that her castro was 180 spm    STG 10 visits:  1. Normal hip extension ROM to allow for improved glute activation  2. 5/5 L hip strength to normalize walking gait  3. Pt able to walk with normal gait quality community distances without pain  4. Pt able to hop painfree with proper landing mechanics when cleared by physician to prepare for the impact of running      LTG 20 visits:  1. Pt able to run 1 mile painfree with acceptable mechanics to reduce loading rates and understand proper progression for getting back IND to a normal training load     Pt. Education:  [x] Yes  [] No  [] Reviewed Prior HEP/Ed  Method of Education: [x] Verbal  [] Demo  [] Written  Comprehension of Education:  [x] Verbalizes understanding. [] Demonstrates understanding. [] Needs review. [] Demonstrates/verbalizes HEP/Ed previously given. Plan: [x] Continue per plan of care.    [] Other:        Time In:  Time Out:   Electronically signed by:  Rosario Smith PT

## 2020-08-04 NOTE — PROGRESS NOTES
[] [address]  [facility] &  Therapy  [address]  [phone]  F: [phone] [] [address]  [facility]   [address]  P: [phone]  F: [phone] [x] [address]  Therapy  [address] [facility]  P: [phone]  F: [phone] [] [address]  [facility]   [address]   [place]: [phone]  F: [phone]      Physical Therapy Daily Treatment Note    Date:  2020  Patient Name:  Sarahi Villagomez    :  1980  MRN: [id number]  Physician: Dr. Ricardo Engle: Pelon Prieto  Medical Diagnosis: L pubic ramus fx and L acetabular fx                  Rehab Codes:S76.312A   Onset Date: 19                 Next Dr. Stack Matar: 20  Visit# / total visits:    Cancels/No Shows: 0/0    Subjective:    Pain:  [] Yes  [] No Location: L hip  Pain Rating: (0-10 scale) 1-2/10- more tight than anything  Pain altered Tx:  [x] No  [] Yes  Action:  Comments: she reports that she attempted to run 40 min, but did not eat breakfast or lunch, ran in the heat of the day. Made it 30 min.   Heart rate was 170 bpm when it is usually 150      Objective:  Manual:  none  Precautions: standard  Exercises:  Exercise Reps/ Time Weight/ Level Issued for HEP   Comments   Burrito calf stretch 2'           1/2 kneel hip flexor stretch w/ knee flexed 2'       Prone             Flying squirrels  20   /      Hip ER 2 sert        Hip ext (glut max)  2x10   x    2 pillows to neutral hip ext    Hip abd  2x10   x  -     Supine             ADIM walkouts w/ TB UE pulldown 2x10  Grey x      ADIM leg fall outs 2x10   x  hooklying   ADIM with alt UE 2X10 2# x      Hip flexor s  2'           Bridges             1 legged bridges  2x15 8\" 8/4 x     Posture bridge w/march  x20    x       Sidelying             Clams 90/30 deg 2x10   x  -     Bere hip abd  2x10   x       Quadruped        [place] hip ext 30   8/4 x     Donkey toes leg raises 10  8/4 x    Donkey toes arm raises  10  8/4 x            Gym        Eccentric calf raises 3x15 Off step 8/4 --    Heel tap-3 direction x10ea  4\"  x   limited ROM   Monster walks  3 laps  Blue  x   feet   Step ups  x10  6\"  x   Posterior and lateral   Quadruped firehydrant 2x10  x     Posture bridge w/march 2sets       Hip thrust x20       MOBO        Foot rocks x30  FINS 2/4  B   KB pass x20 18#   B   SL stand w/opp Hip abd/ER 2x10 Orange    B@ knees   Star squat x5       Single leg deadlift 2x5       HR x30  FINS 1/4     Powestride w overhead reach x30 4# FINS 1/4     Tippy bird w/lat pull 2x10 blue      D2 w SL stand 2sets black   TT at #1   Horizontal abd w/SL stand 2 sets black      Plank with MOBO rock side to side 1 set    Cue to not deviate hips in transverse plane   TM run/walk  (treadmill)  5.7/2.5   5 min walk PRAKASH and CD           PLYO        Impact lunge fwd/45/lat 2x15       skaters 2x20        Next Visit: She can run every other day for 30 min consecutive at 5.7 mph and a castro of 180 spm.      Hand held dynomometer (make protocol) placed on medial mallelous with pt in prone and knee flexed to 90 deg  7/17/20  Hip ER (neutral)   R 17.8/17.9/17.0 ave: 17.6   L:16.2/14.2/16.3 15.6 Difference 2.0 lbs (12%)    Hip ER ( in 45 deg of ER)    R:14.7/13.2/13.4 ave 16.4  L: 9.6/9.0/8.8 ave ave 9.1 Difference 7.3 lbs (45%)      7/21/20  L 13.0/13.7/14.6 ave: 13.8 Difference 2.0 (12.5%)   R 15.2/16.7/16.2 ave: 16.0     7/28/20  L 13.6/15.4/15.7 ave:14.9 Difference 0.4 (2.7%)  R 14.4/16.0/15.5 ave: 15.3      Treatment Charges: Mins Units   []  Modalities     [x]  Ther Exercise 45 3   []  Manual Therapy     []  Ther Activities     []  Aquatics     []  Vasocompression     []  Other     Total Treatment time 45 3       Assessment: [] Progressing toward goals. [] No change.      [x] Other:pt presents with  Full

## 2020-08-13 ENCOUNTER — HOSPITAL ENCOUNTER (OUTPATIENT)
Dept: PHYSICAL THERAPY | Facility: CLINIC | Age: 40
Setting detail: THERAPIES SERIES
Discharge: HOME | End: 2020-08-13
Payer: COMMERCIAL

## 2020-08-13 PROCEDURE — 97112 NEUROMUSCULAR REEDUCATION: CPT

## 2020-08-13 PROCEDURE — 97140 MANUAL THERAPY 1/> REGIONS: CPT

## 2020-08-13 PROCEDURE — 97110 THERAPEUTIC EXERCISES: CPT

## 2020-08-13 NOTE — PROGRESS NOTES
[] [address]  [address]  [phone]  F: [phone] [] [address]  [facility] [place] [facility]   [address]  P: [phone]  F: [phone] [x] [address] &  Therapy  [address] [facility]  P: [phone]  F: [phone] [] [address]  [facility]   [address]   [place]: [phone]  F: [phone]      Physical Therapy Daily Treatment Note    Date:  2020  Patient Name:  Raya Duran    :  1980  MRN: [id number]  Physician: Dr. Issa Harper: [facility]  Medical Diagnosis: L pubic ramus fx and L acetabular fx                  Rehab Codes:S76.312A   Onset Date: 19                 Next Dr. Perfecto Chi: 20  Visit# / total visits:    Cancels/No Shows: 0/0    Subjective:    Pain:  [] Yes  [] No Location: L hip  Pain Rating: (0-10 scale) 1-2/10- more tight than anything  Pain altered Tx:  [x] No  [] Yes  Action:  Still struggling with nutrition timing and did it again with not eating prior to run and had to cut it short because I knew better than to push     Objective:  Manual: MET to correct L Post innom rotation, DI glute med, hip flexor and piriformis  Precautions: standard  Exercises:  Exercise Reps/ Time Weight/ Level Issued for HEP   Comments   Burrito calf stretch 2'           1/2 kneel hip flexor stretch w/ knee flexed 2'       Prone             Flying squirrels  20         Hip ER 2 sets   x Manual resistance at 0& 45 hip flexion   Hip ext (glut max)  2x10   x    2 pillows to neutral hip ext    Hip abd  2x10   x  -     Supine             ADIM walkouts w/ TB UE pulldown 2x10  Grey x      ADIM leg fall outs 2x10   x  hooklying   ADIM with alt UE 2X10 2# x      Hip flexor s  2'           Bridges             1 legged bridges  2x15 8\"       Posture bridge w/march  x20    x       Sidelying             Clams 90/30 deg 2x10   x  -     hip abd  2 sets   x  x  to metronome at 90   Quadruped        [place] hip ext 30   8/4      Donkey toes leg raises 10  8/4     Donkey toes arm raises  10  8/4             Gym        Eccentric calf raises 3x15 Off step 8/4 --    Heel tap-3 direction x10ea  4\"  x   limited ROM   Monster walks  3 laps  Blue  x   feet   Step ups  x10  6\"  x   Posterior and lateral   Quadruped firehydrant 2x10  x     Posture bridge w/march 2sets       Hip thrust x20       MOBO        Foot rocks x30  FINS 2/4  B   KB pass x20 18#   B   SL stand w/opp Hip abd/ER 2x10 Orange    B@ knees   Star squat x5       Single leg deadlift 2x5       HR x30  FINS 1/4     Powestride w overhead reach x30 4# FINS 1/4     Tippy bird w/lat pull 2x10 blue      D2 w SL stand 2sets black   TT at #1   Horizontal abd w/SL stand 2 sets black      Plank with MOBO rock side to side 1 set    Cue to not deviate hips in transverse plane   TM run/walk  (treadmill)  5.7/2.5   5 min walk PRAKASH and CD           PLYO        Impact lunge fwd/45/lat 2x15       skaters 2x20        Next Visit: She can run every other day for 30 min consecutive at 5.7 mph and a castro of 180 spm.      Hand held dynomometer (make protocol) placed on medial mallelous with pt in prone and knee flexed to 90 deg  7/17/20  Hip ER (neutral)   R 17.8/17.9/17.0 ave: 17.6   L:16.2/14.2/16.3 15.6 Difference 2.0 lbs (12%)    Hip ER ( in 45 deg of ER)    R:14.7/13.2/13.4 ave 16.4  L: 9.6/9.0/8.8 ave ave 9.1 Difference 7.3 lbs (45%)      7/21/20  L 13.0/13.7/14.6 ave: 13.8 Difference 2.0 (12.5%)   R 15.2/16.7/16.2 ave: 16.0     7/28/20  L 13.6/15.4/15.7 ave:14.9 Difference 0.4 (2.7%)  R 14.4/16.0/15.5 ave: 15.3     8/13/20  L hip ER make protocol at 0 hip flex 16.1  @45 hip flex 16.9     Treatment Charges: Mins Units   []  Modalities     [x]  Ther Exercise 5 3   [x]  Manual Therapy 15 1   []  Ther Activities     []  Aquatics     [] Vasocompression     [x]  NMR 15 1   Total Treatment time 45 3       Assessment: [] Progressing toward goals. [] No change. [x] Other:pt presents with  Full hip ROM in all directions. MMT glut max 5/5 L; 5/5 R. Pt now landing with a forefoot strike at 180 castro. With the forefoot strike she was landing further from her COM than desired. She was not demonstrating the proper amount of knee flexion in swing thus falling short of  // to ground in swing. Once she corrected this she was a nice midfoot strike at a 175spm castro and reported this was more natural feeling and easier to maintain  STG 10 visits:  1. Normal hip extension ROM to allow for improved glute activation  2. 5/5 L hip strength to normalize walking gait  3. Pt able to walk with normal gait quality community distances without pain  4. Pt able to hop painfree with proper landing mechanics when cleared by physician to prepare for the impact of running      LTG 20 visits:  1. Pt able to run 1 mile painfree with acceptable mechanics to reduce loading rates and understand proper progression for getting back IND to a normal training load     Pt. Education:  [x] Yes  [] No  [x] Reviewed Prior HEP/Ed  Method of Education: [x] Verbal  [] Demo  [x] Written  Comprehension of Education:  [x] Verbalizes understanding. [] Demonstrates understanding. [] Needs review. [] Demonstrates/verbalizes HEP/Ed previously given. Plan: [x] Continue per plan of care. She is to follow up with Padma Daniels next week. Allowed to run every other day up to 40 min. Alternate days with Ons and Altras as she will already wear Minimus all day long.      [] Other:        Time In:1305 Time Out: 4104  Electronically signed by:  Janae Manuel, PT

## 2020-08-27 ENCOUNTER — HOSPITAL ENCOUNTER (OUTPATIENT)
Dept: PHYSICAL THERAPY | Facility: CLINIC | Age: 40
Setting detail: THERAPIES SERIES
Discharge: HOME | End: 2020-08-27
Payer: COMMERCIAL

## 2020-08-27 PROCEDURE — 97110 THERAPEUTIC EXERCISES: CPT

## 2020-08-27 PROCEDURE — 97140 MANUAL THERAPY 1/> REGIONS: CPT

## 2020-08-27 NOTE — PROGRESS NOTES
x  -     1/2 side plank w/hip abd  2 sets   x  x  to metronome at 90   Quadruped        [place] hip ext 30   8/4      Donkey toes leg raises 10  8/4     Donkey toes arm raises  10  8/4             Gym        Eccentric calf raises 3x15 Off step 8/4 --    Heel tap-3 direction x10ea  4\"  x   limited ROM   Monster walks  2 laps  Black  x  x feet   Step ups  x10  6\"  x   Posterior and lateral   Quadruped firehydrant 2x10  x     Posture bridge w/march 2sets       Hip thrust x20       MOBO        Foot rocks x30  FINS 2/4  B   KB pass x20 18#   B   SL stand w/opp Hip abd/ER 2x10 Orange    B@ knees   Star squat x5       Single leg deadlift 2x5       HR x30  FINS 1/4     Powestride w overhead reach x30 4# FINS 1/4     Tippy bird w/lat pull 2x10 blue      D2 w SL stand 2sets black   TT at #1   Horizontal abd w/SL stand 2 sets black      Plank with KB pullthrough  1 set grey  x Cue to not deviate hips in transverse plane   TM run/walk  (treadmill)  5.7/2.5   5 min walk PRAKASH and CD           PLYO        Impact lunge fwd/45/lat 2x15       skaters 2x20       NMR   Pace 10:31  Shoe Altra  Duration 5min  Shirley 178spm       Hand held dynomometer (make protocol) placed on medial mallelous with pt in prone and knee flexed to 90 deg  7/17/20  Hip ER (neutral)   R 17.8/17.9/17.0 ave: 17.6   L:16.2/14.2/16.3 15.6 Difference 2.0 lbs (12%)    Hip ER ( in 45 deg of ER)    R:14.7/13.2/13.4 ave 16.4  L: 9.6/9.0/8.8 ave ave 9.1 Difference 7.3 lbs (45%)      7/21/20  L 13.0/13.7/14.6 ave: 13.8 Difference 2.0 (12.5%)   R 15.2/16.7/16.2 ave: 16.0     7/28/20  L 13.6/15.4/15.7 ave:14.9 Difference 0.4 (2.7%)  R 14.4/16.0/15.5 ave: 15.3     8/13/20  L hip ER make protocol at 0 hip flex 16.1  @45 hip flex 16.9     Treatment Charges: Mins Units   []  Modalities     [x]  Ther Exercise 30 2   [x]  Manual Therapy 15 1   []  Ther Activities     []  Aquatics     []  Vasocompression     [x]  NMR 5 NC   Total Treatment time 50 3       Assessment: [] Progressing toward goals. [] No change. [x] Other:pt functionally tested with single leg squat. R genu valgus, L pt was stable without any deviation. This functional weakness/lack of neuromuscular control was seen in frontal plane with running with contralateral hip drop in R stance not in L. Sound run mechanics in sagittal plane. Pt did state while running that when going slower outside at 10:30-10:40 min/ mile pace she is dipping into the 160's. Cautioned that she needs to increase that to reduce chances of reinjury. STG 10 visits:  1. Normal hip extension ROM to allow for improved glute activation  2. 5/5 L hip strength to normalize walking gait  3. Pt able to walk with normal gait quality community distances without pain  4. Pt able to hop painfree with proper landing mechanics when cleared by physician to prepare for the impact of running      LTG 20 visits:  1. Pt able to run 1 mile painfree with acceptable mechanics to reduce loading rates and understand proper progression for getting back IND to a normal training load     Pt. Education:  [x] Yes  [] No  [x] Reviewed Prior HEP/Ed  Method of Education: [x] Verbal  [] Demo  [x] Written  Comprehension of Education:  [x] Verbalizes understanding. [] Demonstrates understanding. [] Needs review. [] Demonstrates/verbalizes HEP/Ed previously given. Plan: [x] Continue per plan of care. She is to follow up with Zhane Barrera next week. Allowed to run every other day up to 40 min. Alternate days with Ons and Altras as she will already wear Minimus all day long.      [] Other:        Time In:3985 Time Out: 8456  Electronically signed by:  Zoila Celeste, PT

## 2020-09-08 ENCOUNTER — HOSPITAL ENCOUNTER (OUTPATIENT)
Dept: PHYSICAL THERAPY | Facility: CLINIC | Age: 40
Setting detail: THERAPIES SERIES
Discharge: HOME | End: 2020-09-08
Payer: COMMERCIAL

## 2020-09-08 PROCEDURE — 97110 THERAPEUTIC EXERCISES: CPT

## 2020-09-08 PROCEDURE — 97140 MANUAL THERAPY 1/> REGIONS: CPT

## 2020-09-08 NOTE — PROGRESS NOTES
x     1/2 side plank dips  2 sets   x  x     Hip abduction 2 sets   x Manually resisted   Quadruped        [place] hip ext 30   8/4      Donkey toes leg raises 10  8/4     Donkey toes arm raises  10  8/4             Gym        [place] deadlift 2x10 purple  x    Eccentric calf raises 3x15 Off step 8/4 --    Heel tap-3 direction x10ea  4\"  x   limited ROM   Monster walks  2 laps  Black  x  x feet   Step ups  x10  6\"  x   Posterior and lateral   Quadruped firehydrant 2x10  x     Posture bridge w/march 2sets       Hip thrust x20       MOBO        Foot rocks x30  FINS 2/4  B   KB pass x20 18#   B   SL stand w/opp Hip abd/ER 2x10 Orange    B@ knees   Star squat x5       Single leg deadlift 2x5       HR x30  FINS 1/4     Powestride w overhead reach x30 4# FINS 1/4     Tippy bird w/lat pull 2x10 blue      D2 w SL stand 2sets black   TT at #1   Horizontal abd w/SL stand 2 sets black      Plank with KB pullthrough  1 set purple  x Cue to not deviate hips in transverse plane   TM run/walk  (treadmill)  5.7/2.5   5 min walk PRAKASH and CD           PLYO        Impact lunge fwd/45/lat 2x15       skaters 2x20              Hand held dynomometer (make protocol) placed on medial mallelous with pt in prone and knee flexed to 90 deg  7/17/20  Hip ER (neutral)   R 17.8/17.9/17.0 ave: 17.6   L:16.2/14.2/16.3 15.6 Difference 2.0 lbs (12%)    Hip ER ( in 45 deg of ER)    R:14.7/13.2/13.4 ave 16.4  L: 9.6/9.0/8.8 ave ave 9.1 Difference 7.3 lbs (45%)      7/21/20  L 13.0/13.7/14.6 ave: 13.8 Difference 2.0 (12.5%)   R 15.2/16.7/16.2 ave: 16.0     7/28/20  L 13.6/15.4/15.7 ave:14.9 Difference 0.4 (2.7%)  R 14.4/16.0/15.5 ave: 15.3     8/13/20  L hip ER make protocol at 0 hip flex 16.1  @45 hip flex 16.9     Treatment Charges: Mins Units   []  Modalities     [x]  Ther Exercise 35 2   [x]  Manual Therapy 15 1   []  Ther Activities     []  Aquatics     []  Vasocompression     []  NMR     Total Treatment time 50 3      Assessment: [] Progressing toward goals.    [] No change. [x] Other:pt is stronger with manually resisted hip ER at 0 and 45 deg. Side plank dips are still challenging and fatigue. STG 10 visits:  1. Normal hip extension ROM to allow for improved glute activation  2. 5/5 L hip strength to normalize walking gait  3. Pt able to walk with normal gait quality community distances without pain  4. Pt able to hop painfree with proper landing mechanics when cleared by physician to prepare for the impact of running      LTG 20 visits:  1. Pt able to run 1 mile painfree with acceptable mechanics to reduce loading rates and understand proper progression for getting back IND to a normal training load     Pt. Education:  [x] Yes  [] No  [x] Reviewed Prior HEP/Ed  Method of Education: [x] Verbal  [] Demo  [x] Written  Comprehension of Education:  [x] Verbalizes understanding. [] Demonstrates understanding. [] Needs review. [] Demonstrates/verbalizes HEP/Ed previously given. Plan: [x] Continue per plan of care. She is to follow up with Brooke Kohli next week. Allowed to run every other day up to 40 min. Alternate days with Ons and Altras as she will already wear Minimus all day long.      [] Other:        Time In:1530Time Out: 1620  Electronically signed by:  Eduardo Oakley PT

## 2020-09-29 ENCOUNTER — HOSPITAL ENCOUNTER (OUTPATIENT)
Dept: PHYSICAL THERAPY | Facility: CLINIC | Age: 40
Setting detail: THERAPIES SERIES
Discharge: HOME | End: 2020-09-29
Payer: COMMERCIAL

## 2020-09-29 PROCEDURE — 97140 MANUAL THERAPY 1/> REGIONS: CPT

## 2020-09-29 NOTE — PROGRESS NOTES
[] [facility]) - [facility] &  Therapy  [address]  [phone]  F: [phone] [] [address]  [place] [address]   [address]  P: [phone]  F: [phone] [x] [address] &  Therapy  [address] [facility]  P: [phone]  F: [phone] [] [address]  [facility]   [address]   [place]: [phone]  F: [phone]      Physical Therapy Daily Treatment Note    Date:  2020  Patient Name:  Forrest Sawant    :  1980  MRN: [id number]  Physician: Dr. Kristen Carranza: Stan Sibley  Medical Diagnosis: L pubic ramus fx and L acetabular fx                  Rehab Codes:S76.312A   Onset Date: 19                 Next Dr. Oliveira : 20  Visit# / total visits: 25/30   Cancels/No Shows: 0/0    Subjective:    Pain:  [] Yes  [] No Location: L hip  Pain Rating: (0-10 scale) 3/10  Pain altered Tx:  [x] No  [] Yes  Action:  Pt reports she is having LB pain today. Several 16 hour shifts at work that were very busy. Last PM especially busy and on feet for long periods. Reported shifting around in seat on the hour drive home as well with foot up due to being overfatigued. Have been running about 12 miles per week. Trying to be smart and get my body used to being back at work then adding a little running in. [address] planned this afternoon.      Objective:  Manual:  MET to correct R upslip, DI hip flexor Proximal and distal, glute med, QL, and piriformis, Hypervolt R TFL vastus lateralis,MET L post rib 7-9, PA glide T3-8  Precautions: standard  Exercises:  Exercise Reps/ Time Weight/ Level Issued for HEP   Comments   Burrito calf stretch 2'           1/2 kneel hip flexor stretch w/ knee flexed 2'       Prone             Flying squirrels  20   x      Hip ER 2 sets    Manual resistance at 0& 45 hip flexion Hip ext (glut max)  2x10   x    2 pillows to neutral hip ext    Hip abd  2x10   x   Manually resisted   Supine             ADIM walkouts w/ TB UE pulldown 2x10  Grey x      ADIM leg fall outs 2x10   x  hooklying   ADIM with alt UE 2X10 2# x      Hip flexor s  2'           Bridges             1 legged bridges  2x15        Posture bridge w/march  x20    x       Sidelying             Clams 90/30 deg 2x10   x       1/2 side plank dips  2 sets   x       Hip abduction 2 sets    Manually resisted   Quadruped        Mobile hip ext 30   8/4      Donkey toes leg raises 10  8/4     Donkey toes arm raises  10  8/4             Gym        Curtsy deadlift 2x10 purple      Eccentric calf raises 3x15 Off step 8/4 --    Heel tap-3 direction x10ea  4\"  x   limited ROM   Monster walks  2 laps  Black  x  feet   Step ups  x10  6\"  x   Posterior and lateral   Quadruped firehydrant 2x10  x     Posture bridge w/march 2sets       Hip thrust x20       MOBO        Foot rocks x30  FINS 2/4 x L   KB pass x30 9#  x L   Banded Twist x30 blue  x L   Band pull x20 blue  x L   Tennis ball toss x20   x L   SL stand w/opp Hip abd/ER 2x10 Orange    B@ knees   Star squat x5       Single leg deadlift 2x5       HR x30  FINS 1/4     Powestride w overhead reach x30 4# FINS 1/4     Tippy bird w/lat pull 2x10 blue      D2 w SL stand 2sets black   TT at #1   Horizontal abd w/SL stand 2 sets black      Plank with KB pullthrough  1 set purple   Cue to not deviate hips in transverse plane   TM run/walk  (treadmill)  5.7/2.5   5 min walk PRAKASH and CD           PLYO        Impact lunge fwd/45/lat 2x15       skaters 2x20              Hand held dynomometer (make protocol) placed on medial mallelous with pt in prone and knee flexed to 90 deg    8/13/20  L hip ER make protocol at 0 hip flex 16.1  @45 hip flex 16.9     Treatment Charges: Mins Units   []  Modalities     [x]  Ther Exercise 5 NC   [x]  Manual Therapy 40 3   []  Ther Activities     []  Aquatics     [] Vasocompression     []  NMR     Total Treatment time 40 3      Assessment: [x] Progressing toward goals. Retested Hip strength L glute max and glute med at 4/5, ER30/90 5/5,  R hip 5/5 with ext, abd, ER30/90. Pt is most likely offloading that side. Pt stands post exercise weight shifted. onto R side. Encouraged pt to do a 10 minutes of work several times a week to train proprioception and activation of muscles. No running this date as pt has 4 miles planned later today and swam just prior to this visit. [] No change. [] Other:  STG 10 visits:  1. Normal hip extension ROM to allow for improved glute activation  2. 5/5 L hip strength to normalize walking gait  3. Pt able to walk with normal gait quality community distances without pain  4. Pt able to hop painfree with proper landing mechanics when cleared by physician to prepare for the impact of running      LTG 20 visits:  1. Pt able to run 1 mile painfree with acceptable mechanics to reduce loading rates and understand proper progression for getting back IND to a normal training load     Pt. Education:  [x] Yes  [] No  [x] Reviewed Prior HEP/Ed  Method of Education: [x] Verbal  [] Demo  [x] Written  Comprehension of Education:  [x] Verbalizes understanding. [] Demonstrates understanding. [] Needs review. [] Demonstrates/verbalizes HEP/Ed previously given. Plan: [x] Continue per plan of care. She is to follow up with Skye Crooks next week. Allowed to run every other day up to 40 min. Alternate days with Ons and Altras as she will already wear Minimus all day long.      [] Other:        Time In:1040Time Out: 1125  Electronically signed by:  Patricia Richter, PT

## 2020-10-13 ENCOUNTER — HOSPITAL ENCOUNTER (OUTPATIENT)
Dept: PHYSICAL THERAPY | Facility: CLINIC | Age: 40
Setting detail: THERAPIES SERIES
Discharge: HOME | End: 2020-10-13
Payer: COMMERCIAL

## 2020-10-13 PROCEDURE — 97110 THERAPEUTIC EXERCISES: CPT

## 2020-10-13 NOTE — FLOWSHEET NOTE
Therapy     []  Ther Activities     []  Aquatics     []  Vasocompression     []  NMR     Total Treatment time 50 3      Assessment: [x] Progressing toward goals. Hip abd remains 4/5, but is 5/5 with all others. Full ROM. Continues to have a postural fault of R contralateral pelvic drop when standing on L leg. Streamlined her HEP for a more advanced  hip abd strengthening. Lateral step downs were highlighted by strong genu valgus     [] No change. [] Other:  STG 10 visits:  1. Normal hip extension ROM to allow for improved glute activation  2. 5/5 L hip strength to normalize walking gait  3. Pt able to walk with normal gait quality community distances without pain  4. Pt able to hop painfree with proper landing mechanics when cleared by physician to prepare for the impact of running      LTG 20 visits:  1. Pt able to run 1 mile painfree with acceptable mechanics to reduce loading rates and understand proper progression for getting back IND to a normal training load     Pt. Education:  [x] Yes  [] No  [x] Reviewed Prior HEP/Ed  Method of Education: [] Verbal  [x] Demo  [x] Written  Comprehension of Education:  [] Verbalizes understanding. [] Demonstrates understanding. [] Needs review. [x] Demonstrates/verbalizes HEP/Ed previously given. Plan: [x] Continue per plan of care. She is to follow up with Rowan Esquivel in 3weeks. Reemphasized postural compliance and HEP compliance which should be better with a more streamlined and advanced HEP.  permittd to use MOBO rather than step but should be 20-45 deg of knee flexion   [] Other:        Time In:1000  Time Out: [address]  Electronically signed by:  Erin Schroeder, PT

## 2020-11-05 ENCOUNTER — HOSPITAL ENCOUNTER (OUTPATIENT)
Dept: PHYSICAL THERAPY | Facility: CLINIC | Age: 40
Setting detail: THERAPIES SERIES
Discharge: HOME | End: 2020-11-05
Payer: COMMERCIAL

## 2020-11-05 PROCEDURE — 97140 MANUAL THERAPY 1/> REGIONS: CPT

## 2020-11-05 NOTE — FLOWSHEET NOTE
[] [facility]) - [facility] &  Therapy  [address]  [phone]  F: [phone] [] [address]  [facility]   [address]  P: [phone]  F: [phone] [x] [address] &  Therapy  [address] [facility]  P: [phone]  F: [phone] [] [address]  [facility]   [address]   [place]: [phone]  F: [phone]      Physical Therapy Daily Treatment Note    Date:  2020  Patient Name:  Tanvi Healy    :  1980  MRN: [id number]  Physician: Dr. Nataly Baez: Noy Saul  Medical Diagnosis: L pubic ramus fx and L acetabular fx                  Rehab Codes:S76.312A   Onset Date: 19                 Next Dr. Cherry Laron: 20  Visit# / total visits:    Cancels/No Shows: 0/0    Subjective:    Pain:  [] Yes  [] No Location: L hip  Pain Rating: (0-10 scale) 2/10 LB, 0/10 L hip  Pain altered Tx:  [x] No  [] Yes  Action:  Comments: Pt reports after having LBP last visit and it not resolving she had it checked. Xray showed pars defect L6-S1 8mm slipage. Was not graded but just given a concrete amount of 8mm. Back is doing better. Hip symptoms are resolved. Was able to run 4 miles the other day.       Objective:  Manual:  DI R piriformis, glute med   Precautions: standard  Exercises:  Exercise Reps/ Time Weight/ Level Issued for HEP   Comments   Sidelying             side plank hip abd 2xfatigue   10/13      Star plank 1xfatigue  10/13  Manually resisted   Gym        Heel tap - lateral  x10ea  4\"  x   limited ROM   Monster walks  2 laps  Black  x  feet   Step ups  x10  6\"  x   Posterior and lateral   Quadruped firehydrant 2x10  x     Posture bridge /march 2sets       Hip thrust x20             Treatment Charges: Mins Units   []  Modalities     []  Ther Exercise     [x]  Manual Therapy 10 1 []  Ther Activities     []  Aquatics     []  Vasocompression     []  NMR     Total Treatment time 10 1      Assessment: [x] Progressing toward goals. Hip strength rechecked and is 5/5 ext, ER, abd and she has full hip ROM. Cautioned patient when working on abdominals that special attention paid to spine neutral. No working past fatigue to ensure good form and no stomach sleeping due to the new development with having a spondylolisthesis. [] No change. [] Other:  STG 10 visits:  1. Normal hip extension ROM to allow for improved glute activation  2. 5/5 L hip strength to normalize walking gait  3. Pt able to walk with normal gait quality community distances without pain  4. Pt able to hop painfree with proper landing mechanics when cleared by physician to prepare for the impact of running      LTG 20 visits:  1. Pt able to run 1 mile painfree with acceptable mechanics to reduce loading rates and understand proper progression for getting back IND to a normal training load     Pt. Education:  [x] Yes  [] No  [x] Reviewed Prior HEP/Ed  Method of Education: [] Verbal  [x] Demo  [x] Written  Comprehension of Education:  [] Verbalizes understanding. [] Demonstrates understanding. [] Needs review. [x] Demonstrates/verbalizes HEP/Ed previously given.      Plan: DC to IND HEP at this time            Time In:1210  Time Out: 1225  Electronically signed by:  Roger Gastelum, PT

## 2022-03-08 ENCOUNTER — HOSPITAL ENCOUNTER (OUTPATIENT)
Dept: PHYSICAL THERAPY | Facility: CLINIC | Age: 42
Setting detail: THERAPIES SERIES
Discharge: HOME | End: 2022-03-08
Payer: COMMERCIAL

## 2022-03-08 PROCEDURE — 97750 PHYSICAL PERFORMANCE TEST: CPT

## 2022-03-08 PROCEDURE — 97161 PT EVAL LOW COMPLEX 20 MIN: CPT

## 2022-03-08 PROCEDURE — 97140 MANUAL THERAPY 1/> REGIONS: CPT

## 2022-03-08 PROCEDURE — 97110 THERAPEUTIC EXERCISES: CPT

## 2022-03-08 NOTE — CONSULTS
[x] [address]   Outpatient Rehabilitation &  Therapy  [address] [place] [address]  P: [phone]  F: [phone] [] [email or web address]  P: [phone]  F: [phone] [] [address]  [facility]   [place]: [phone]  F: [phone]         Physical Therapy Running Evaluation    Date:  3/8/2022  Patient: Bautista Grant   : 1980  MRN: [id number]  Physician: Dr. Aaron Castro: [facility]  Medical Diagnosis:  L buttock pain, L hamstring  Rehab Codes: R26.89, M76.01  Onset date: 22    Next 's appt.:4-6 weeks  Subjective:   CC:22 out for a 10 mile run and path was ice and snow covered. Have had pain since then. Pain started at outside of ankle and worked it's way up to the hip. Took a couple days off.  Still been able to run as usual but just still having buttock pain and with my history of pelvic and acetabular fracture I know to get it checked      PMHx: [] Unremarkable [] Diabetes [] HTN  [] Pacemaker   [] MI/Heart Problems [] Cancer [] Arthritis  [] Other:              [x] Refer to full medical chart  In EPIC     Tests: [x] X-Ray: Nothing new at previous pelvic fracture site/normal [] MRI:  [] none:     Medications: [x] Refer to full medical record [] None [] Other:  Allergies:      [x] Refer to full medical record [] None [] Other:    Working:  [x] Normal Duty  [] Light Duty  [] Off D/T Condition  [] Retired    [] Not Employed    []  Disability  [] Other:           Return to work:     Job/ADL Description:  NICU CNP at [employer]    Pain:  [x] Yes  [] No   Location:  L buttock Pain Rating: (0-10 scale) 1/10  Pain altered Tx:  [] Yes  [x] No  Action:  Symptoms:  [x] Improving [] Worsening [] Same  Better:  [] Meds    [] Ice pack    [] Sit    [x] Not running  []Stand    [] Walk    [] Stretching   [] Other:  Worse: [x] Run- at end of longer mileage runs    [] Easy    [] Speed work    []Stand    [] Walk    [] Stairs    [] Sit    [] Other:  Sleep: [x] OK    [] Disturbed    Objective:    ROM  ° A/P STRENGTH TESTS (+/-) Left Right Not Tested    Left Right Left Right Ant.  Drawer   []   Hip Flex WNL WNL   Post. Drawer   []   Ext WNL WNL 4+ 4+ Lachmans   []   ER WNL WNL 5 5 Valgus Stress   []   IR WNL WNL   Varus Stress   []   ABD WNL WNL 5 5 Joelles   []   Knee Flex WNL WNL 4+ @10 deg knee flex 5 FADIRs -  []   Ext WNL WNL   Hip Scouring   []   Ankle DF knee straight 5 10   ARTIEs -  []   PF WNL WNL   Piriformis -  []   INV WNL WNL   Connies   []   EVER WNL WNL   SLR -  []   GTE 20%limited WNL   Slump -  []       OBSERVATION No Deficit Deficit Not Tested Comments   Posture       Forward Head [] [x] []    Rounded Shoulders [] [x] []    Tspine [] [x] [] FRSR  T4-6, L posterior ribs 6-8   Lordosis [x] [] []    Iliac Crest [] [x] [] R elevated in standing   Leg Length Discrp [] [x] [] L short supine and stays short with long sit test   Palpation [] [x] [] L hip flexor, L gastroc, L soleus B glute med spasm   Sensation [] [x] []    Gait [] [x] [] Analysis:  See video analysis   Video Run Analysis   Pace: 9:49  min/mile    Shoes: Altra perera   Shirley: 167 spm    Frontal plane deviations:    Increased pronation    Dynamic genu valgus    Contralateral pelvic drop R    Lateral trunk bend L           Sagittal plane deviations:     Increased knee extension at initial contact R with forefoot strike    R IC normal with midfoot strike    Limited hip ext from midstance to toe off only on L    Knees extend over toes at midstance    Increased backward trunk lean L                 Recommendations:     FUNCTIONAL TESTS PAIN NO PAIN COMMENTS   1 legged squat [] [x] R normal L valgus collapse throughout  LE   Posterior tibialis dysfunction [] [x] L + post tib dysfunction   # of 1 legged calf raises L18 but compensated at 14 R 20        Functional Test: UWRI Score: 39% functionally impaired Comments:  Assessment:Patient would benefit from skilled physical therapy services in order to: Correct pelvic, thoracic and rib dysfunction as well as gastroc, soleus and glute max strength deficits to correct running mechanics and allow pt to train as she wishes  Problems:    [x] ? Pain:     [x] ? ROM:    [x] ? Strength:    [x] ? Function: Not able to run as she wishes   [] ? Balance  [] Increased edema:  [x] Postural Deviations  [x] Gait Deviations  [x]  UWRI   [] Other:      STG: (to be met in 5 treatments)  1. ? Pain:<3/10 L LE to allow pt to continue to train  2. ? ROM: Normal L great toe extension and ankle DF to allow for normal LE mechanics  3. ? Strength:5/5 glute max strength  4. ? Function:Able to hold corrections made at pelvis and be able to hold ankle neutral during single leg exercises that don't involve impact   5. Independent with Home Exercise Programs    LTG: (to be met in 12 treatments)  1. No pain L LE to allow pt to run uninhibited   2. Able to perform SL squat and 20 reps of SL HR without valgus collapse at foot, ankle or knee  3. UWRI <5% limitation   4. Able to run as she wishes                   Patient goals:Decrease pain    Rehab Potential:  [x] Good  [] Fair  [] Poor   Suggested Professional Referral:  [x] No  [] Yes:  Barriers to Goal Achievement[de-identified]  [x] No  [] Yes:  Domestic Concerns:  [x] No  [] Yes:    Pt. Education:  [x] Plans/Goals, Risks/Benefits discussed  [x] Home exercise program    Method of Education: [x] Verbal  [x] Demo  [x] Written  Comprehension of Education:  [] Verbalizes understanding. [] Demonstrates understanding. [] Needs Review. [] Demonstrates/verbalizes understanding of HEP/Ed previously given.     Treatment Plan:  [x] Therapeutic Exercise    [x] Therapeutic Activity  [x] Manual Therapy   [x] Alter G treadmill  [x] Phys perf test     [x] Vasocompression/Game Ready   [x] Neuromuscular Re-education [x] Instruction in HEP     [x] Aquatic Therapy Frequency:  1x/week for 12 visits    Todays Treatment:  Manual:pubic MET , L upslip mob, DI B glute med, L hip flexor proximal and distal, L hip lateral glide, Hypervolt L calf  Precautions: standard  Exercises:  Exercise Reps/ Time Weight/ Level Issued for HEP  Comments   Prone        Flying squirrels        Hip ext (glut max)                Supine        2 legged bridges        1 legged eccentric  bridges   x     PB hamstring curls     Hips to remain in neutral to ext   [employer] 90/30 deg        1/2 side plank w/ hip abd demo  x     Gym        Burrito calf stretch 1'ea  x x    Great toe contract activate  10x10\"  x x    Fig 8 HR 2 sets lime x x    SL soleus HR 2 sets  x x Cue for ankle neutral   Monster walks        Foam roller Tspine demo  x     MOBO foot rocks   x  Posterior and lateral   Other:    Specific Instructions for next treatment:    Treatment Charges: Mins Units   [x] Evaluation       [x]  Low       []  Moderate       []  High 15 1   [] Phys perf test     [x]  Ther Exercise 15 1   [x]  Manual Therapy 20 1   []  Ther Activities     [x]  Physical performance eval 10 1   []  Vasocompression     []  NMR       TOTAL TREATMENT TIME: 60    Time in:1040   Time Out:1140    Electronically signed by: Tristan Shabazz PT        Physician Signature:________________________________Date:__________________  By signing above or cosigning this note, I have reviewed this plan of care and certify a need for medically necessary rehabilitation services.      *PLEASE SIGN ABOVE AND FAX BACK ALL PAGES*

## 2022-03-15 ENCOUNTER — HOSPITAL ENCOUNTER (OUTPATIENT)
Dept: PHYSICAL THERAPY | Facility: CLINIC | Age: 42
Setting detail: THERAPIES SERIES
Discharge: HOME | End: 2022-03-15
Payer: COMMERCIAL

## 2022-03-15 NOTE — FLOWSHEET NOTE
[] [facility]) [facility] &  Therapy  [address]    [phone]  F: [phone]   [] [address]  [facility]   [address]  P: [phone]  F: [phone]  [] [address]  [address]  P: [phone]  F: [phone] [] [address]  P: [phone]  F: [phone]  [] [address]  [address] [facility] [address]   [address]   [place]: [phone]  F: [phone]   [] [facility]  [address] [facility] [address]  [place]: [phone]   F: [phone]     Physical Therapy Cancel/No Show note    Date: 3/15/2022  Patient: Suleman Douglas  : 1980  MRN: [id number]    Cancels/No Shows to date:     For today's appointment patient:    [x]  Cancelled    [] Rescheduled appointment    [] No-show     Reason given by patient:    []  Patient ill    []  Conflicting appointment    [] No transportation      [] Conflict with work    [x] No reason given    [] Weather related    [] COVID-19    [] Other:      Comments:  called to cx, no reason was given stated she will call back to r/s       [] Next appointment was confirmed    Electronically signed by: Little Allen

## 2022-03-31 ENCOUNTER — HOSPITAL ENCOUNTER (OUTPATIENT)
Dept: PHYSICAL THERAPY | Facility: CLINIC | Age: 42
Setting detail: THERAPIES SERIES
Discharge: HOME | End: 2022-03-31
Payer: COMMERCIAL

## 2022-03-31 PROCEDURE — 97140 MANUAL THERAPY 1/> REGIONS: CPT

## 2022-03-31 NOTE — FLOWSHEET NOTE
[] [facility]) - [facility] &  Therapy  [address]  [phone]  F: [phone] [] [address]  [place] [address]   [address]  P: [phone]  F: [phone] [] [place] &  Therapy  [address]  P: [phone]  F: [phone] [] [address]  P: [phone]  F: [phone] [] [address]  [facility]   [address]   [place]: [phone]  F: [phone]      Physical Therapy Daily Treatment Note    Date:  3/31/2022  Patient Name:  Dickson Main    :  1980  MRN: [id number]  Physician: Dr. Delonte Rogers: [facility]  Medical Diagnosis:   L buttock pain, L hamstring                Rehab Codes: R26.89, M76.01  Onset date: 22                                         Next 's appt.:4-6 weeks  Visit# / total visits:2     Cancels/No Shows: 0    Subjective:    Pain:  [x] Yes  [] No Location: L SI Pain Rating: (0-10 scale)4/10  Pain altered Tx:  [] No  [] Yes  Action:  Comments:Sitting and running especially the day after running.  Sat in the car for 2 days driving back     Objective:  Manual:pubic MET , L upslip mob, DI B glute med, L hip flexor proximal and distal, Pressup with overpressure L sacrum, Lumbar passive rotation stretch with overpressure  Precautions:  Exercises:  Exercise Reps/ Time Weight/ Level Issued for HEP   Comments   Prone             Flying squirrels             Hip ext (glut max)                           Supine             Foam roller hip flexor stretch  2x1'    x  x     1 legged eccentric  bridges     x       PB hamstring curls         Hips to remain in neutral to ext   [employer]             Sidelying             Clams 90/30 deg             1/2 side plank w/ hip abd demo   x       Gym             Burrito calf stretch 1'ea   x      Great toe contract activate  10x10\"   x      Fig 8 HR 2 sets lime x      SL soleus HR 2 sets   x  Cue for ankle neutral   Monster walks  verbal review    x       Foam roller Tspine demo   x       MOBO foot rocks     x   Posterior and lateral   Other:     Specific Instructions for next treatment:        Treatment Charges: Mins Units   []  Modalities     [x]  Ther Exercise 3 NC   [x]  Manual Therapy 40 3   []  Ther Activities     []  Aquatics     []  Vasocompression     []  Other     Total Treatment time 43 2       Assessment: [x] Progressing toward goals. Pubic correction was effective this date along with MOB to correct upslip. Les= pain with posterior pelvic tilt and sitting post manual techniques. Held most exercise due to pt only having 2 hrs of sleep. [] No change. [] Other:  [x] Patient would continue to benefit from skilled physical therapy services in order to: Correct pelvic, thoracic and rib dysfunction as well as gastroc, soleus and glute max strength deficits to correct running mechanics and allow pt to train as she wishes    STG: (to be met in 5 treatments)  1. ? Pain:<3/10 L LE to allow pt to continue to train  2. ? ROM: Normal L great toe extension and ankle DF to allow for normal LE mechanics  3. ? Strength:5/5 glute max strength  4. ? Function:Able to hold corrections made at pelvis and be able to hold ankle neutral during single leg exercises that don't involve impact   5. Independent with Home Exercise Programs     LTG: (to be met in 12 treatments)  1. No pain L LE to allow pt to run uninhibited   2. Able to perform SL squat and 20 reps of SL HR without valgus collapse at foot, ankle or knee  3. UWRI <5% limitation   4. Able to run as she wishes                    Patient goals:Decrease pain    Pt.  Education:  [x] Yes  [] No  [] Reviewed Prior HEP/Ed  Method of Education: [x] Verbal  [] Demo  [] Written  Comprehension of Education:  [] Verbalizes understanding. [] Demonstrates understanding. [] Needs review. [] Demonstrates/verbalizes HEP/Ed previously given. Plan: [x] Continue current frequency toward long and short term goals.     [x] Specific Instructions for subsequent treatments:     Time In:   7500        Time Out: 8042    Electronically signed by:  Wicho Toney PT

## 2022-04-07 ENCOUNTER — HOSPITAL ENCOUNTER (OUTPATIENT)
Dept: PHYSICAL THERAPY | Facility: CLINIC | Age: 42
Setting detail: THERAPIES SERIES
Discharge: HOME | End: 2022-04-07
Payer: COMMERCIAL

## 2022-04-07 PROCEDURE — 97140 MANUAL THERAPY 1/> REGIONS: CPT

## 2022-04-07 PROCEDURE — 97110 THERAPEUTIC EXERCISES: CPT

## 2022-04-07 NOTE — FLOWSHEET NOTE
[] [facility]) - [facility]  [address]  [phone]  F: [phone] [] [address]  [address]   [address]  P: [phone]  F: [phone] [] [place] &  Therapy  [address]  P: [phone]  F: [phone] [] [address]  P: [phone]  F: [phone] [] [address]  [facility]   [address]   [place]: [phone]  F: [phone]      Physical Therapy Daily Treatment Note    Date:  2022  Patient Name:  Namrata Nielson    :  1980  MRN: [id number]  Physician: Dr. Hernadez Hedge: [facility]  Medical Diagnosis:   L buttock pain, L hamstring                Rehab Codes: R26.89, M76.01  Onset date: 22                                         Next 's appt.:4-6 weeks  Visit# / total visits:3/12     Cancels/No Shows: 0    Subjective:    Pain:  [x] Yes  [] No Location: L SI Pain Rating: (0-10 scale)4/10  Pain altered Tx:  [] No  [] Yes  Action:  Comments:Doing OK.  Still feeling some L SI pain, Foot is better    Objective:  Manual:pubic MET , L LE long axis pull, DI L glute med, L hip flexor proximal and distal  Precautions:  Exercises:  Exercise Reps/ Time Weight/ Level Issued for HEP   Comments   Prone             Flying squirrels             Hip ext (glut max)                           Supine             Foam roller hip flexor stretch  2x1'    x       Heel push for pelvic mobility  x10   x  x  Cue for ant tilt   PB hamstring curls         Hips to remain in neutral to ext   [employer]             Sidelying             Clams 90/30 deg             1/2 side plank w/ hip abd demo   x       Gym             SGIS x10  x x    Cat/Cow standing x1-  x x    Burrito calf stretch 1'ea   x    Ball arch roll Great toe stretch 10x10\"   x x     Lax ball HR 2 sets  x x     Toe yoga x10  x x    Hip hike 2x10 4\" x x    SL soleus HR 2 sets   x  Cue for ankle neutral   Monster walks  verbal review          Foam roller Tspine demo          MOBO foot rocks        Posterior and lateral   Other:     Specific Instructions for next treatment:  NMR   Pace:949  Duration: 5 Min  Shoe:Altra perera  Shirley:180  Cue: relax shoulders back and down, stand tall      Treatment Charges: Mins Units   []  Modalities     [x]  Ther Exercise 25 2   [x]  Manual Therapy 15 1   []  Ther Activities     []  Aquatics     []  Vasocompression     [x]  NMR 5 NC   Total Treatment time 45 3       Assessment: [x] Progressing toward goals. Pt has lack of frontal plane mobility at the pelvis on the L. Compensated with feet, contralateral QL and glute. She is also restricted in anterior pelvic tilt. During run if pt is asked to stand tall she reports L SI pain with HIp extension which correlates with decreased anterior pelvic tilt. [] No change. [] Other:  [x] Patient would continue to benefit from skilled physical therapy services in order to: Correct pelvic, thoracic and rib dysfunction as well as gastroc, soleus and glute max strength deficits to correct running mechanics and allow pt to train as she wishes    STG: (to be met in 5 treatments)  1. ? Pain:<3/10 L LE to allow pt to continue to train  2. ? ROM: Normal L great toe extension and ankle DF to allow for normal LE mechanics  3. ? Strength:5/5 glute max strength  4. ? Function:Able to hold corrections made at pelvis and be able to hold ankle neutral during single leg exercises that don't involve impact   5. Independent with Home Exercise Programs     LTG: (to be met in 12 treatments)  1. No pain L LE to allow pt to run uninhibited   2. Able to perform SL squat and 20 reps of SL HR without valgus collapse at foot, ankle or knee  3. UWRI <5% limitation   4.  Able to run as she wishes                    Patient goals:Decrease pain    Pt. Education:  [x] Yes  [] No  [] Reviewed Prior HEP/Ed  Method of Education: [x] Verbal  [] Demo  [] Written  Comprehension of Education:  [] Verbalizes understanding. [] Demonstrates understanding. [] Needs review. [] Demonstrates/verbalizes HEP/Ed previously given. Plan: [x] Continue current frequency toward long and short term goals.     [x] Specific Instructions for subsequent treatments:     Time In:   1005        Time Out: 7014    Electronically signed by:  Drew Montoya PT

## 2022-04-14 ENCOUNTER — HOSPITAL ENCOUNTER (OUTPATIENT)
Dept: PHYSICAL THERAPY | Facility: CLINIC | Age: 42
Setting detail: THERAPIES SERIES
Discharge: HOME | End: 2022-04-14
Payer: COMMERCIAL

## 2022-04-14 PROCEDURE — 97110 THERAPEUTIC EXERCISES: CPT

## 2022-04-14 PROCEDURE — 97140 MANUAL THERAPY 1/> REGIONS: CPT

## 2022-04-14 NOTE — FLOWSHEET NOTE
[] [facility]) - [facility] &  Therapy  [address]  [phone]  F: [phone] [] [address]  [address]   [address]  P: [phone]  F: [phone] [] [place] &  Therapy  [address]  P: [phone]  F: [phone] [] [address]  P: [phone]  F: [phone] [] [address]  [facility]   [address]   [place]: [phone]  F: [phone]      Physical Therapy Daily Treatment Note    Date:  2022  Patient Name:  Lexis Sandoval    :  1980  MRN: [id number]  Physician: Dr. Iliana Ontiveros: [facility]  Medical Diagnosis:   L buttock pain, L hamstring                Rehab Codes: R26.89, M76.01  Onset date: 22                                         Next 's appt.:4-6 weeks  Visit# / total visits:     Cancels/No Shows: 0    Subjective:    Pain:  [x] Yes  [] No Location: L SI Pain Rating: (0-10 scale)0/10  Pain altered Tx:  [] No  [] Yes  Action:  Comments:Felt the worst I've felt since starting after last visit.  Most pain with rolling over in bed    Objective:  Manual:Di B piriformis and Glute med, L hip flexor proximal  Precautions:  Exercises:  Exercise Reps/ Time Weight/ Level Issued for HEP   Comments   Prone             Flying squirrels             Hip ext (glut max)                           Supine             Foam roller hip flexor stretch  2x1'    x       Heel push for pelvic mobility  x10   x    Cue for ant tilt   PB hamstring curls         Hips to remain in neutral to ext   [employer]             Sidelying             Clams 90/30 deg             1/2 side plank w/ hip abd demo   x       Gym             MOBO foot rocks x30 1/3,2/4 x x Cue for posture to not get frontal plane deviation   Burrito calf stretch 2x1'ea   x x  L only   Ball arch roll  30\"   x x     Lax ball HR 2 sets  x x     Toe yoga x10  x x    Hip hike 2x10 4\" x x    SL soleus HR 21 sets   x x Cue for ankle neutral   Monster walks  verbal review          Foam roller Tspine demo          MOBO foot rocks        Posterior and lateral   Other:     Specific Instructions for next treatment:  NMR   Pace:949  Duration: 5 Min  Shoe:Altra perera  Shirley:180  Cue:none      Treatment Charges: Mins Units   []  Modalities     [x]  Ther Exercise 30 2   [x]  Manual Therapy 15 1   []  Ther Activities     []  Aquatics     []  Vasocompression     [x]  NMR 5 NC   Total Treatment time 50 3       Assessment: [x] Progressing toward goals. Great toe extension on L is normal, L ankle DF limited. Discontinued cat cow and SGIS as pt reported LBP and more SI pain. Pt does demonstrate decreased foot intrinsic and extrinsic strength. Also falls into an anterior tilt when attempting to perform hip hike on step and substitutes with QL. Able to correct with manual and verbal cue to posterior tilt. Then able to get mechanics corrected with no overpronation at foot, no frontal plane deviation at hip and no substituting with QL.    [] No change. [] Other:  [x] Patient would continue to benefit from skilled physical therapy services in order to: Correct pelvic, thoracic and rib dysfunction as well as gastroc, soleus and glute max strength deficits to correct running mechanics and allow pt to train as she wishes    STG: (to be met in 5 treatments)  1. ? Pain:<3/10 L LE to allow pt to continue to train  2. ? ROM: Normal L great toe extension and ankle DF to allow for normal LE mechanics  3. ? Strength:5/5 glute max strength  4. ? Function:Able to hold corrections made at pelvis and be able to hold ankle neutral during single leg exercises that don't involve impact   5.  Independent with Home Exercise Programs     LTG: (to be met in 12 treatments)  1. No pain L LE to allow pt to run uninhibited   2. Able to perform SL squat and 20 reps of SL HR without valgus collapse at foot, ankle or knee  3. UWRI <5% limitation   4. Able to run as she wishes                    Patient goals:Decrease pain    Pt. Education:  [x] Yes  [] No  [] Reviewed Prior HEP/Ed  Method of Education: [x] Verbal  [] Demo  [] Written  Comprehension of Education:  [] Verbalizes understanding. [] Demonstrates understanding. [] Needs review. [] Demonstrates/verbalizes HEP/Ed previously given. Plan: [x] Continue current frequency toward long and short term goals.     [x] Specific Instructions for subsequent treatments:     Time In:  1210       Time Out: 1300    Electronically signed by:  Otilio Winchester PT

## 2022-04-21 ENCOUNTER — HOSPITAL ENCOUNTER (OUTPATIENT)
Dept: PHYSICAL THERAPY | Facility: CLINIC | Age: 42
Setting detail: THERAPIES SERIES
Discharge: HOME | End: 2022-04-21
Payer: COMMERCIAL

## 2022-04-21 PROCEDURE — 97110 THERAPEUTIC EXERCISES: CPT

## 2022-04-21 PROCEDURE — 97140 MANUAL THERAPY 1/> REGIONS: CPT

## 2022-04-21 NOTE — FLOWSHEET NOTE
[] [address] [facility] &  Therapy  [address]  [phone]  F: [phone] [] [address]  [email or web address]   [address]  P: [phone]  F: [phone] [x] [place] &  Therapy  [address]  P: [phone]  F: [phone] [] [address]  P: [phone]  F: [phone] [] [address]  [facility]   [address]   [place]: [phone]  F: [phone]      Physical Therapy Daily Treatment Note    Date:  2022  Patient Name:  Fred Sample    :  1980  MRN: [id number]  Physician: Dr. Madelaine Vigil: [facility]  Medical Diagnosis:   L buttock pain, L hamstring                Rehab Codes: R26.89, M76.01  Onset date: 22                                         Next 's appt.:4-6 weeks  Visit# / total visits:     Cancels/No Shows: 0    Subjective:    Pain:  [x] Yes  [] No Location: L SI Pain Rating: (0-10 scale)0/10  Pain altered Tx:  [] No  [] Yes  Action:  Comments:Doin ok. Sometimes still getting a zing down to foot on the L. Just depends how I move. Not sure if I have just stopped pushing with my foot to turn over in bed or if it is better.     Objective:  Manual:Di B piriformis and Glute med, L QL, MOB FRSR T4-8  Precautions:  Exercises:  Exercise Reps/ Time Weight/ Level Issued for HEP   Comments   Prone             Flying squirrels             Hip ext (glut max)                           Supine             Foam roller hip flexor stretch  2x1'    x       Heel push for pelvic mobility  x10   x    Cue for ant tilt   Dead bug x20      x    Bridge marches             Sidelying             Clams 90/30 deg             1/2 side plank w/ hip abd 2x10   x  x     Gym             MOBO foot rocks x30 1/3,2/4 x x Cue for posture to not get frontal plane deviation   Burrito calf stretch 2x1'ea   x   L only   Ball arch roll  30\"   x x     SL HR 2 x15  x x     Toe yoga x10  x     Hip hike 2x10 4\" x x    SL soleus HR 2 sets   x x Cue for ankle neutral   Monster walks  verbal review          Foam roller Tspine demo          Resisted hip flex  2x10  orange x  x    Other:     Specific Instructions for next treatment:      Treatment Charges: Mins Units   []  Modalities     [x]  Ther Exercise 35 2   [x]  Manual Therapy 15 1   []  Ther Activities     []  Aquatics     []  Vasocompression     []  NMR     Total Treatment time 50 3       Assessment: [x] Progressing toward goals. Hip extension on L is now normal. No restriction even at TFL. Improved hip hike ability although pt does tend to want to fall into an anterior tilt at pelvis and use QL instead of utilizing glute max but with concentrated effort can correct. Added resisted hip flexion with light band. This is where frontal and transverse plane deviations show up more consistently and pt has a difficult time controlling ER of L LE and controlling eccentric hip flex. [] No change. [] Other:  [x] Patient would continue to benefit from skilled physical therapy services in order to: Correct pelvic, thoracic and rib dysfunction as well as gastroc, soleus and glute max strength deficits to correct running mechanics and allow pt to train as she wishes    STG: (to be met in 5 treatments)  1. ? Pain:<3/10 L LE to allow pt to continue to train  2. ? ROM: Normal L great toe extension and ankle DF to allow for normal LE mechanics  3. ? Strength:5/5 glute max strength  4. ? Function:Able to hold corrections made at pelvis and be able to hold ankle neutral during single leg exercises that don't involve impact   5. Independent with Home Exercise Programs     LTG: (to be met in 12 treatments)  1. No pain L LE to allow pt to run uninhibited   2.  Able to perform SL squat and 20 reps of SL HR without valgus collapse at foot, ankle or knee  3. UWRI <5% limitation   4. Able to run as she wishes                    Patient goals:Decrease pain    Pt. Education:  [x] Yes  [] No  [] Reviewed Prior HEP/Ed  Method of Education: [x] Verbal  [] Demo  [] Written  Comprehension of Education:  [] Verbalizes understanding. [] Demonstrates understanding. [] Needs review. [] Demonstrates/verbalizes HEP/Ed previously given. Plan: [x] Continue current frequency toward long and short term goals.     [x] Specific Instructions for subsequent treatments:     Time In:  9971       Time Out: 1800    Electronically signed by:  Zeinab Salas, PT

## 2022-05-03 ENCOUNTER — HOSPITAL ENCOUNTER (OUTPATIENT)
Dept: PHYSICAL THERAPY | Facility: CLINIC | Age: 42
Setting detail: THERAPIES SERIES
Discharge: HOME | End: 2022-05-03
Payer: COMMERCIAL

## 2022-05-03 PROCEDURE — 97140 MANUAL THERAPY 1/> REGIONS: CPT

## 2022-05-03 PROCEDURE — 97110 THERAPEUTIC EXERCISES: CPT

## 2022-05-03 NOTE — FLOWSHEET NOTE
[] [facility]) - [facility] &  Therapy  [address]  [phone]  F: [phone] [] [address]  [address]   [address]  P: [phone]  F: [phone] [x] [place] &  Therapy  [address]  P: [phone]  F: [phone] [] [address]  P: [phone]  F: [phone] [] [address]  [facility]   [address]   [place]: [phone]  F: [phone]      Physical Therapy Daily Treatment Note    Date:  5/3/2022  Patient Name:  Mary Henson    :  1980  MRN: [id number]  Physician: Dr. Elisabeth Burns: [facility]  Medical Diagnosis:   L buttock pain, L hamstring                Rehab Codes: R26.89, M76.01  Onset date: 22                                         Next 's appt.:4-6 weeks  Visit# / total visits:     Cancels/No Shows: 0    Subjective:    Pain:  [x] Yes  [] No Location: L SI Pain Rating: (0-10 scale)0/10  Pain altered Tx:  [] No  [] Yes  Action:  Comments:Was able to complete the half marathon. Had bad sciatica after the . It is better now. Did have follow up with  Dr. Morena Wong. MRI is ordered.     Objective:  Manual:DI B piriformis and Glute med, L QL, MOB FRSL T4-8, TP middle trap  Precautions:  Exercises:  Exercise Reps/ Time Weight/ Level Issued for HEP   Comments   Prone             Flying squirrels             Hip ext (glut max)                           Supine             Foam roller hip flexor stretch  2x1'    x       Heel push for pelvic mobility  x10   x    Cue for ant tilt   March with breathing x10   x  x    Bent knee fall out x10ea    x  x     Sidelying             Clams 90/30 deg             1/2 side plank w/ hip abd 2x10   x       Quadruped        Positioning with breathe x15  x x    Gym             Single leg stand Supination expression w/ breath 2x5  x x    MOBO foot rocks x30 1/3,2/4 x  Cue for posture to not get frontal plane deviation   Burrito calf stretch 2x1'ea   x   L only   Ball arch roll  30\"   x      SL HR 2 x15  x      Toe yoga x10  x     Hip hike 2x10 4\" x     SL soleus HR 2 sets   x  Cue for ankle neutral   Monster walks  verbal review          Foam roller Tspine demo          Resisted hip flex  2x10  orange x      Other:     Specific Instructions for next treatment:recheck breathing pattern       Treatment Charges: Mins Units   []  Modalities     [x]  Ther Exercise 25 2   [x]  Manual Therapy 15 1   []  Ther Activities     []  Aquatics     []  Vasocompression     []  NMR     Total Treatment time 40 3       Assessment: [x] Progressing toward goals. Pt's hip extension is normal. Her piriformis and L QL are still in spasm. Worked on breath control and found that pt is Oblique dominant. She likes to live in a position of posterior pelvic tilt and relies on her LB/QL as well as obliques for stabilization. She had a hard time in standing not activating QL. Also difficult time controlling pronation in standing. Pt did well learning breath control in supine and getting ribs stacked on top of pelvis to be able to decrease intraabdominal pressure with leg movement. She fatigues quickly however with sagittal plane LE movement and reverts back to oblique dominant breathing. [] No change.      [] Other:  [x] Patient would continue to benefit from skilled physical therapy services in order to: Correct pelvic, thoracic and rib dysfunction as well as gastroc, soleus and glute max strength deficits to correct running mechanics and allow pt to train as she wishes    STG: (to be met in 5 treatments)  1. ? Pain:<3/10 L LE to allow pt to continue to train  2. ? ROM: Normal L great toe extension and ankle DF to allow for normal LE mechanics  3. ? Strength:5/5 glute max strength  4. ? Function:Able to hold corrections made at pelvis and be able to hold ankle neutral during single leg exercises that don't involve impact   5. Independent with Home Exercise Programs     LTG: (to be met in 12 treatments)  1. No pain L LE to allow pt to run uninhibited   2. Able to perform SL squat and 20 reps of SL HR without valgus collapse at foot, ankle or knee  3. UWRI <5% limitation   4. Able to run as she wishes                    Patient goals:Decrease pain    Pt. Education:  [x] Yes  [] No  [] Reviewed Prior HEP/Ed  Method of Education: [x] Verbal  [] Demo  [] Written  Comprehension of Education:  [] Verbalizes understanding. [] Demonstrates understanding. [] Needs review. [] Demonstrates/verbalizes HEP/Ed previously given. Plan: [x] Continue current frequency toward long and short term goals.     [x] Specific Instructions for subsequent treatments:     Time In:  1140       Time Out: 8654    Electronically signed by:  Yvan Bernal, PT

## 2022-05-10 ENCOUNTER — HOSPITAL ENCOUNTER (OUTPATIENT)
Dept: PHYSICAL THERAPY | Facility: CLINIC | Age: 42
Setting detail: THERAPIES SERIES
Discharge: HOME | End: 2022-05-10
Payer: COMMERCIAL

## 2022-05-10 PROCEDURE — 97110 THERAPEUTIC EXERCISES: CPT

## 2022-05-10 PROCEDURE — 97140 MANUAL THERAPY 1/> REGIONS: CPT

## 2022-05-10 NOTE — FLOWSHEET NOTE
[] [facility]) - [facility] &  Therapy  [address]  [phone]  F: [phone] [] [address]  [facility] [address]   [address]  P: [phone]  F: [phone] [x] [place] &  Therapy  [address]  P: [phone]  F: [phone] [] [address]  P: [phone]  F: [phone] [] [address]  [facility]   [address]   [place]: [phone]  F: [phone]      Physical Therapy Daily Treatment Note    Date:  5/10/2022  Patient Name:  Ozzy Hernandez    :  1980  MRN: [id number]  Physician: Dr. Tawanna Cunha: [facility]  Medical Diagnosis:   L buttock pain, L hamstring                Rehab Codes: R26.89, M76.01  Onset date: 22                                         Next 's appt.:4-6 weeks  Visit# / total visits:     Cancels/No Shows: 0    Subjective:    Pain:  [x] Yes  [] No Location: L SI Pain Rating: (0-10 scale)0/10  Pain altered Tx:  [] No  [] Yes  Action:  Comments:TUesday is the MRI.      Objective:  Manual:DI B piriformis and Glute med, L QL, MOB FRSL T4-8, TP middle trap  Precautions:  Exercises:  Exercise Reps/ Time Weight/ Level Issued for HEP   Comments   Prone             Flying squirrels             Hip ext (glut max)                           Supine             Foam roller hip flexor stretch  2x1'    x       Heel push for pelvic mobility  x10   x    Cue for ant tilt   March with breathing x20   x  x    Bent knee fall out x10ea    x  x     Sidelying             Clams 90/30 deg             1/2 side plank w/ hip abd 2x10   x       Quadruped        Positioning with breathe x15  x     Gym             Fig 4 stretch seated 2x30\"   x    Single leg stand Supination expression w/ breath 2x5  x x    MOBO foot rocks x30 1/3,2/4 x  Cue for posture to not get frontal plane deviation   Burrito calf stretch 2x1'ea   x   L only   Ball arch roll  30\"   x      SL HR 2 x15  x      Toe yoga x10  x     Hip hike 2x10 4\" x     SL soleus HR 2 sets   x  Cue for ankle neutral   Monster walks  verbal review          Foam roller Tspine demo          Resisted hip flex  2x10  orange x      Other:     Specific Instructions for next treatment:recheck breathing pattern       Treatment Charges: Mins Units   []  Modalities     [x]  Ther Exercise 30 2   [x]  Manual Therapy 15 1   []  Ther Activities     []  Aquatics     []  Vasocompression     []  NMR     Total Treatment time 45 3       Assessment: [x] Progressing toward goals. L hip extension normal, R hip extension limited. L posterior innom rotation. Corrected with pubic MET. Breathing pattern improved in supine. In standing with SL wall squat QL is on due to not enough lateral movement of lower ribs and substituting with Obliques making pt vacillate between anterior and posterior tilt instead of holding neutral. Preferred position in standing is still posterior pelvic tilt. CHeck hip flexor strength and add AROM hip flexion next visit prn.  [] No change. [] Other:  [x] Patient would continue to benefit from skilled physical therapy services in order to: Correct pelvic, thoracic and rib dysfunction as well as gastroc, soleus and glute max strength deficits to correct running mechanics and allow pt to train as she wishes    STG: (to be met in 5 treatments)  1. ? Pain:<3/10 L LE to allow pt to continue to train  2. ? ROM: Normal L great toe extension and ankle DF to allow for normal LE mechanics  3. ? Strength:5/5 glute max strength  4. ? Function:Able to hold corrections made at pelvis and be able to hold ankle neutral during single leg exercises that don't involve impact   5. Independent with Home Exercise Programs     LTG: (to be met in 12 treatments)  1.  No pain L LE to allow pt to run uninhibited   2. Able to perform SL squat and 20 reps of SL HR without valgus collapse at foot, ankle or knee  3. UWRI <5% limitation   4. Able to run as she wishes                    Patient goals:Decrease pain    Pt. Education:  [x] Yes  [] No  [] Reviewed Prior HEP/Ed  Method of Education: [x] Verbal  [] Demo  [] Written  Comprehension of Education:  [] Verbalizes understanding. [] Demonstrates understanding. [] Needs review. [] Demonstrates/verbalizes HEP/Ed previously given. Plan: [x] Continue current frequency toward long and short term goals.     [x] Specific Instructions for subsequent treatments:     Time In:  8033       Time Out: 1200    Electronically signed by:  Tien Meade PT

## 2022-05-19 ENCOUNTER — HOSPITAL ENCOUNTER (OUTPATIENT)
Dept: PHYSICAL THERAPY | Facility: CLINIC | Age: 42
Setting detail: THERAPIES SERIES
Discharge: HOME | End: 2022-05-19
Payer: COMMERCIAL

## 2022-05-19 PROCEDURE — 97110 THERAPEUTIC EXERCISES: CPT

## 2022-05-19 PROCEDURE — 97140 MANUAL THERAPY 1/> REGIONS: CPT

## 2022-05-19 NOTE — FLOWSHEET NOTE
[] [facility] [facility]  [address]  [phone]  F: [phone] [] [address]  [place] [address]   [address]  P: [phone]  F: [phone] [x] [place] [facility]  [address] [facility]  P: [phone]  F: [phone] [] [address]  P: [phone]  F: [phone] [] [address]  [facility]   [address]   [place]: [phone]  F: [phone]      Physical Therapy Daily Treatment Note    Date:  2022  Patient Name:  Katherine Wei (Galdino-lorie)    :  1980  MRN: [id number]  Physician: Dr. Farris Nab: [facility]  Medical Diagnosis:   L buttock pain, L hamstring                Rehab Codes: R26.89, M76.01  Onset date: 22                                         Next Dr's appt.:4-6 weeks  Visit# / total visits:     Cancels/No Shows: 0    Subjective:    Pain:  [x] Yes  [] No Location: L SI Pain Rating: (0-10 scale)0/10  Pain altered Tx:  [] No  [] Yes  Action:  Comments:MRI result came back and pt has L5-S1 anterolisthesis , diffuse disc bulge, marginal disc osteophyte, L L5 subarticular /foraminal disc extrusion and likely impingement exiting L5 nerve root Mild R neural foraminal narrowing, Pt reports her L foot has been asleep since Friday.  Sleeping has been a problem due to L QL/back pain and L foot tingling    Objective:  Manual:DI L hip flexor proximal and distal, L glute med, QF  Precautions:  Exercises:  Exercise Reps/ Time Weight/ Level Issued for HEP   Comments   Prone             Over 3 pillows with hips shifted R  x5'    x  x     L sidelying over 3 pillows  x3'     x                   Supine             Foam roller hip flexor stretch  2x1'    x       Heel push for pelvic mobility  x10   x    Cue for ant tilt   March with breathing x20   x  x    Bent knee fall out x10ea    x  x     Sidelying             Clams 90/30 deg             1/2 side plank w/ hip abd 2x10   x       Quadruped        Alt LE x15  x x    Alt UE/LE x15  x x    Gym             Fig 4 stretch seated 2x30\"       Single leg stand Supination expression w/ breath 2x5  x     MOBO foot rocks x30 1/3,2/4 x  Cue for posture to not get frontal plane deviation   Burrito calf stretch 2x1'ea   x   L only   Ball arch roll  30\"   x      SL HR 2 x15  x      Toe yoga x10  x     Hip hike 2x10 4\" x     SL soleus HR 2 sets   x  Cue for ankle neutral   Monster walks  verbal review          Foam roller Tspine demo          Resisted hip flex  2x10  orange x      Other:     Specific Instructions for next treatment:Progress as able post physician appointment to discuss MRI result      Treatment Charges: Mins Units   []  Modalities     [x]  Ther Exercise 25 2   [x]  Manual Therapy 15 1   []  Ther Activities     []  Aquatics     []  Vasocompression     []  NMR     Total Treatment time 40 3       Assessment: [x] Progressing toward goals. MRI result as above in Subjective as provided in written form from patient. Worked on [employer] activation this date. Pt continues to overuse rectus abdominus but was able to correct with cues. Also worked on positioning to reduce symtoms of paresthesia. Pt got the most relief from prone over pillows with hips shifted R. In standing symptoms returned. Strength testing at glute max L 5/5. Pt is able to toe walk and heel walk normally. Will continue to work on core and symptom reduction  [] No change. [] Other:  [x] Patient would continue to benefit from skilled physical therapy services in order to: Correct pelvic, thoracic and rib dysfunction as well as gastroc, soleus and glute max strength deficits to correct running mechanics and allow pt to train as she wishes    STG: (to be met in 5 treatments)  1. ? Pain:<3/10 L LE to allow pt to continue to train  2. ? ROM: Normal L great toe extension and ankle DF to allow for normal LE mechanics  3. ? Strength:5/5 glute max strength  4. ? Function:Able to hold corrections made at pelvis and be able to hold ankle neutral during single leg exercises that don't involve impact   5. Independent with Home Exercise Programs     LTG: (to be met in 12 treatments)  1. No pain L LE to allow pt to run uninhibited   2. Able to perform SL squat and 20 reps of SL HR without valgus collapse at foot, ankle or knee  3. UWRI <5% limitation   4. Able to run as she wishes                    Patient goals:Decrease pain    Pt. Education:  [x] Yes  [] No  [] Reviewed Prior HEP/Ed  Method of Education: [x] Verbal  [x] Demo  [] Written  Get prone over pillows with hips shifted R to reduce nerve irritation as much as possible  Comprehension of Education:  [x] Verbalizes understanding. [] Demonstrates understanding. [] Needs review. [] Demonstrates/verbalizes HEP/Ed previously given. Plan: [x] Continue current frequency toward long and short term goals.     [x] Specific Instructions for subsequent treatments:     Time In:  7558      Time Out: 0896    Electronically signed by:  Dariusz Lambert PT

## 2022-05-26 ENCOUNTER — HOSPITAL ENCOUNTER (OUTPATIENT)
Dept: PHYSICAL THERAPY | Facility: CLINIC | Age: 42
Setting detail: THERAPIES SERIES
Discharge: HOME | End: 2022-05-26
Payer: COMMERCIAL

## 2022-05-26 PROCEDURE — 97110 THERAPEUTIC EXERCISES: CPT

## 2022-05-26 NOTE — FLOWSHEET NOTE
[] [facility] [address]  [address]  [phone]  F: [phone] [] [address]  [place] [address]   [address]  P: [phone]  F: [phone] [x] [place] [facility]  [address]  P: [phone]  F: [phone] [] [address]  P: [phone]  F: [phone] [] [address]  [facility]   [address]   [place]: [phone]  F: [phone]      Physical Therapy Daily Treatment Note    Date:  2022  Patient Name:  Cassy Browne)    :  1980  MRN: [id number]  Physician: Dr. Engle Guerra: [facility]  Medical Diagnosis:   L buttock pain, L hamstring                Rehab Codes: R26.89, M76.01  Onset date: 22                                         Next 's appt.:22  Visit# / total visits:     Cancels/No Shows: 0    Subjective:    Pain:  [x] Yes  [] No Location: L SI Pain Rating: (0-10 scale)0/10  Pain altered Tx:  [] No  [] Yes  Action:  Comments:L LE tingling but was able to take that away by lying on R side over pillows opening up L side. On vacation x2 weeks.      Objective:  Manual:prn  Precautions:  Exercises:  Exercise Reps/ Time Weight/ Level Issued for HEP   Comments   Prone             Over 2 pillows with hips shifted R  x5'    x  x     L sidelying over 3 pillows  x3'                        Supine             Breathing for TVA activation x20   x    Foam roller hip flexor stretch  2x1'    x       Heel push for pelvic mobility  x10   x    Cue for ant tilt   March with breathing x10     x    Deadbug x20  x x Hover LE's with short lever & UE   Bent knee fall out x10ea    x       Sidelying             Clams 90/30 deg             1/2 side plank w/ hip abd 2x10   x       Quadruped        Alt LE x20  x x    Forward rock x10   x    Alt UE/LE x15  x     Gym             Fig 4 stretch seated 2x30\"       Single leg stand Supination expression w/ breath 2x5  x     MOBO foot rocks x30 1/3,2/4 x  Cue for posture to not get frontal plane deviation   Burrito calf stretch 2x1'ea   x   L only   Ball arch roll  30\"   x      SL HR 2 x15  x      Toe yoga x10  x     Hip hike 2x10 4\" x     SL soleus HR 2 sets   x  Cue for ankle neutral   Monster walks  verbal review          Foam roller Tspine demo          Resisted hip flex  2x10  orange x      Other:     Specific Instructions for next treatment:Progress as able post physician appointment to discuss MRI result      Treatment Charges: Mins Units   []  Modalities     [x]  Ther Exercise 30 2   []  Manual Therapy     []  Ther Activities     []  Aquatics     []  Vasocompression     []  NMR     Total Treatment time 30 2       Assessment: [x] Progressing toward goals. Advised pt against Lying in R sidelying due to disc bulge L. Prone over 2 pillows with hips shift R pt was able to resolve paresthesia L LE. Worked on core stabilization. Still very difficult for pt to not be overactive at rectus and obliques. She did improve as we practiced. Good pelvic neutral position in supine however needs 2 pillows to maintain back flat and not in increased lumbar lordosis. Pt in quadruped gets into posterior pelvic tilt preferentially. Able to correct with manual cues. Not able to hold neutral pelvis with quaruped rock without manual cue .  [] No change.      [] Other:  [x] Patient would continue to benefit from skilled physical therapy services in order to: Correct pelvic, thoracic and rib dysfunction as well as gastroc, soleus and glute max strength deficits to correct running mechanics and allow pt to train as she wishes    STG: (to be met in 5 treatments)  1. ? Pain:<3/10 L LE to allow pt to continue to train  2. ? ROM: Normal L great toe extension and ankle DF to allow for normal LE mechanics  3. ? Strength:5/5 glute max strength  4. ? Function:Able to hold corrections made at pelvis and be able to hold ankle neutral during single leg exercises that don't involve impact   5. Independent with Home Exercise Programs     LTG: (to be met in 12 treatments)  1. No pain L LE to allow pt to run uninhibited   2. Able to perform SL squat and 20 reps of SL HR without valgus collapse at foot, ankle or knee  3. UWRI <5% limitation   4. Able to run as she wishes                    Patient goals:Decrease pain    Pt. Education:  [x] Yes  [] No  [] Reviewed Prior HEP/Ed  Method of Education: [x] Verbal  [x] Demo  [] Written  Get prone over pillows with hips shifted R to reduce nerve irritation as much as possible  Comprehension of Education:  [x] Verbalizes understanding. [] Demonstrates understanding. [] Needs review. [] Demonstrates/verbalizes HEP/Ed previously given. Plan: [x] Continue current frequency toward long and short term goals.     [x] Specific Instructions for subsequent treatments:     Time In:  3842     Time Out: 2017    Electronically signed by:  Mayra Daly, PT

## 2022-06-16 ENCOUNTER — HOSPITAL ENCOUNTER (OUTPATIENT)
Dept: PHYSICAL THERAPY | Facility: CLINIC | Age: 42
Setting detail: THERAPIES SERIES
Discharge: HOME | End: 2022-06-16
Payer: COMMERCIAL

## 2022-06-16 PROCEDURE — 97140 MANUAL THERAPY 1/> REGIONS: CPT

## 2022-06-16 PROCEDURE — 97110 THERAPEUTIC EXERCISES: CPT

## 2022-06-16 NOTE — FLOWSHEET NOTE
[] [facility]) [facility]  Therapy  [address]  [phone]  F: [phone] [] [address]  [employer] [address]   [address]  P: [phone]  F: [phone] [x] [place] [facility]  [address]  P: [phone]  F: [phone] [] [address]  P: [phone]  F: [phone] [] [address]  [facility]   [address]   [place]: [phone]  F: [phone]      Physical Therapy Daily Treatment Note    Date:  2022  Patient Name:  Yoly Leung    :  1980  MRN: [id number]  Physician: Dr. Choudhary Noss: [facility]  Medical Diagnosis:   L buttock pain, L hamstring                Rehab Codes: R26.89, M76.01  Onset date: 22                                         Next 's appt.:22  Visit# / total visits:10/12     Cancels/No Shows: 0    Subjective:    Pain:  [x] Yes  [] No Location: L SI Pain Rating: (0-10 scale)0/10  Pain altered Tx:  [] No  [] Yes  Action:  Comments:Pt returns from vacation out [place]. She reports she has worked really hard on posture. Did some exercises. Went on 2 mile run 1 time during vacation. Went to ortho on Monday. Gave the OK to get more aggressive with PT with no restrictions and get epidural injection in July. Went from a zing down the L LE to tingling to feeling like bugs crawling. Pt reports that no matter what she does she is unable to get her L QL to not be in spasm.     Objective:  Manual: DI L hip flexor proximal and distal, QF, Glute med,  to adductor   Precautions:  Exercises:  Exercise Reps/ Time Weight/ Level Issued for HEP   Comments   Prone             Over 2 pillows with hips shifted R  x5'    x       L sidelying over 3 pillows  x3'                        Supine             March with breathing x10     x    Deadbug alternating LE out straight x20  x x Hover LE   Bent knee fall out x10ea    x       Sidelying             Clams 90/30 deg             1/2 side plank w/ hip abd 2x10   x       Quadruped        Alt LE x20  x     Forward rock forearms on SB  x15   x    Bird dog  Verbal review  x x    Gym             Tippy twist x10  x x    Single leg stand Supination expression  2 sets  x x    Resisted hip extension 2x10 lime x x    Resisted diagonal hip extension 2x10 lime x x    Resisted hip flex  2x10  lime x  x    Other:     Specific Instructions for next treatment:Dry needling for L quadratus lumborum, pt to continue HEP on her own next week and follow up 6/28 for recheck      Treatment Charges: Mins Units   []  Modalities     [x]  Ther Exercise 40 3   [x]  Manual Therapy 15 1   []  Ther Activities     []  Aquatics     []  Vasocompression     []  NMR     Total Treatment time 55 4       Assessment: [x] Progressing toward goals. Pt has done a great job of getting into a good posture. She no longer is in a posterior pelvic tilt in sitting or standing. Pt's L glute med tests at 4/5 R glute med 4+/5. She fatigues very quickly with single leg standing exercises on the L then feels quadratus lumborum kick in. 5 reps was the limit on single leg wall squat. Will continue to strengthen core and glutes. Pt will try dry needling next visit to L QL to see if that can relieve any symptoms. [] No change.      [] Other:  [x] Patient would continue to benefit from skilled physical therapy services in order to: Correct pelvic, thoracic and rib dysfunction as well as gastroc, soleus and glute max strength deficits to correct running mechanics and allow pt to train as she wishes    STG: (to be met in 5 treatments)  1. ? Pain:<3/10 L LE to allow pt to continue to train  2. ? ROM: Normal L great toe extension and ankle DF to allow for normal LE mechanics  3. ? Strength:5/5 glute max strength  4. ? Function:Able to hold corrections made at pelvis and be able to hold ankle neutral during single leg exercises that don't involve impact   5. Independent with Home Exercise Programs     LTG: (to be met in 12 treatments)  1. No pain L LE to allow pt to run uninhibited   2. Able to perform SL squat and 20 reps of SL HR without valgus collapse at foot, ankle or knee  3. UWRI <5% limitation   4. Able to run as she wishes                    Patient goals:Decrease pain    Pt. Education:  [x] Yes  [] No  [] Reviewed Prior HEP/Ed  Method of Education: [x] Verbal  [x] Demo  [] Written  Get prone over pillows with hips shifted R to reduce nerve irritation as much as possible  Comprehension of Education:  [x] Verbalizes understanding. [] Demonstrates understanding. [] Needs review. [] Demonstrates/verbalizes HEP/Ed previously given. Plan: [x] Continue current frequency toward long and short term goals.     [] Specific Instructions for subsequent treatments:     Time In:  2013     Time Out: 1200    Electronically signed by:  Drew Montoya PT

## 2022-06-20 ENCOUNTER — HOSPITAL ENCOUNTER (OUTPATIENT)
Dept: PHYSICAL THERAPY | Facility: CLINIC | Age: 42
Setting detail: THERAPIES SERIES
Discharge: HOME | End: 2022-06-20
Payer: COMMERCIAL

## 2022-06-20 PROCEDURE — 97140 MANUAL THERAPY 1/> REGIONS: CPT

## 2022-06-20 PROCEDURE — 97112 NEUROMUSCULAR REEDUCATION: CPT

## 2022-06-20 NOTE — FLOWSHEET NOTE
[] [facility]) [facility] &  Therapy  [address]  [phone]  F: [phone] [] [address]  [facility]   [address]  P: [phone]  F: [phone] [x] [place] [facility]  [facility]  [address]  P: [phone]  F: [phone] [] [address]  P: [phone]  F: [phone] [] [address]  [facility]   [address]   [place]: [phone]  F: [phone]      Physical Therapy Daily Treatment Note    Date:  2022  Patient Name:  Yoly Leung    :  1980  MRN: [id number]  Physician: Dr. Choudhary Noss: [facility]  Medical Diagnosis:   L buttock pain, L hamstring                Rehab Codes: R26.89, M76.01  Onset date: 22                                         Next 's appt.:22  Visit# / total visits:     Cancels/No Shows: 0    Subjective:    Pain:  [x] Yes  [] No Location: L SI Pain Rating: (0-10 scale)3/10  Pain altered Tx:  [] No  [] Yes  Action:  Comments:Pt reports feeling frustrated with her L LE symptoms. Travelled to [employer] [place] over the weekend. She hasn't been running. Gave the OK to get more aggressive with PT with no restrictions and get epidural injection in July. Objective:  Manual:10 min ART/MFR to L QL, added DN (5 min) to L-S Spine, L hip, L LE homeostatics and added NMR/NMES to L5-S1, and L LE homeostatics 10 min  Precautions:  Exercises: None this date.   Exercise Reps/ Time Weight/ Level Issued for HEP   Comments   Prone             Over 2 pillows with hips shifted R  x5'    x       L sidelying over 3 pillows  x3'                        Supine             March with breathing x10     x    Deadbug alternating LE out straight x20  x x Hover LE   Bent knee fall out x10ea    x       Sidelying             Clams 90/30 deg             1/2 side plank w/ hip abd 2x10   x       Quadruped        Alt LE x20  x     Forward rock forearms on SB  x15   x    Bird dog  Verbal review  x x    Gym             Tippy twist x10  x x    Single leg stand Supination expression  2 sets  x x    Resisted hip extension 2x10 lime x x    Resisted diagonal hip extension 2x10 lime x x    Resisted hip flex  2x10  lime x  x    Other:     Specific Instructions for next treatment:Dry needling for L quadratus lumborum, pt to continue HEP on her own next week and follow up 6/28 for recheck      Treatment Charges: Mins Units   []  Modalities     []  Ther Exercise     [x]  Manual Therapy 10 1   []  Ther Activities     [x] DN 5 NC   []  Vasocompression     [x]  NMR 10 1   Total Treatment time 25 2       Assessment: [x] Progressing toward goals. Trial of DN along with NMES.to L LE homeostatics. Good tolerance to added treatment. Rev of self-correction for pelvic alignment along with MFR/ART to L QL. She no longer is in a posterior pelvic tilt in sitting or standing. Pt's L glute med tests at 4/5 R glute med 4+/5. She fatigues very quickly with single leg standing exercises on the L then feels quadratus lumborum kick in. 5 reps was the limit on single leg wall squat. Will continue to strengthen core and glutes. Will continue w/trial of DN w/NMES next visit if patient has improvement in symptoms to L QL to see if that can relieve any symptoms. [] No change.      [] Other:  [x] Patient would continue to benefit from skilled physical therapy services in order to: Correct pelvic, thoracic and rib dysfunction as well as gastroc, soleus and glute max strength deficits to correct running mechanics and allow pt to train as she wishes    STG: (to be met in 5 treatments)  1. ? Pain:<3/10 L LE to allow pt to continue to train  2. ? ROM: Normal L great toe extension and ankle DF to allow for normal LE mechanics  3. ? Strength:5/5 glute max strength  4. ? Function:Able to hold corrections made at pelvis and be able to hold ankle neutral during single leg exercises that don't involve impact   5. Independent with Home Exercise Programs     LTG: (to be met in 12 treatments)  1. No pain L LE to allow pt to run uninhibited   2. Able to perform SL squat and 20 reps of SL HR without valgus collapse at foot, ankle or knee  3. UWRI <5% limitation   4. Able to run as she wishes                    Patient goals:Decrease pain    Pt. Education:  [x] Yes  [] No  [] Reviewed Prior HEP/Ed  Method of Education: [x] Verbal  [x] Demo  [] Written  Get prone over pillows with hips shifted R to reduce nerve irritation as much as possible  Comprehension of Education:  [x] Verbalizes understanding. [] Demonstrates understanding. [] Needs review. [] Demonstrates/verbalizes HEP/Ed previously given.      Plan: [x] Continue current frequency toward long and short term goals w/ added treatment prn.    [] Specific Instructions for subsequent treatments:     Time In:  2039     Time Out: 0800    Electronically signed by:  Ezekiel Parrish, PT

## 2022-06-27 ENCOUNTER — HOSPITAL ENCOUNTER (OUTPATIENT)
Dept: PHYSICAL THERAPY | Facility: CLINIC | Age: 42
Setting detail: THERAPIES SERIES
Discharge: HOME | End: 2022-06-27
Payer: COMMERCIAL

## 2022-06-27 PROCEDURE — 97112 NEUROMUSCULAR REEDUCATION: CPT

## 2022-06-27 PROCEDURE — 97140 MANUAL THERAPY 1/> REGIONS: CPT

## 2022-06-27 NOTE — FLOWSHEET NOTE
[] [facility]) [facility] TWELVESTEP [facility] &  Therapy  [address]  [phone]  F: [phone] [] [address]  [employer] [place] [employer] [facility]   [address]  P: [phone]  F: [phone] [x] [place] &  Therapy  [address]  P: [phone]  F: [phone] [] [address]  P: [phone]  F: [phone] [] [address]  [facility]   [address]   [place]: [phone]  F: [phone]      Physical Therapy Daily Treatment Note    Date:  2022  Patient Name:  Ozzy Hernandez    :  1980  MRN: [id number]  Physician: Dr. Tawanna Cunha: [facility]  Medical Diagnosis:   L buttock pain, L hamstring                Rehab Codes: R26.89, M76.01  Onset date: 22                                         Next 's appt.:22  Visit# / total visits:     Cancels/No Shows: 0    Subjective:    Pain:  [x] Yes  [] No Location: L SI Pain Rating: (0-10 scale)5/10  Pain altered Tx:  [] No  [] Yes  Action:  Comments:Pt reports she did well after last session. Felt better for a while. 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 Notes some numbness in her toes. Gave the OK to get more aggressive with PT with no restrictions and get epidural injection in July. Objective:  Manual:40 min ART/MFR to L QL, added CST/. MFR including pelvic diaphragm release, thoracic diaphragm release, t-inlet release, still points, added DN (5 min) to L-S Spine, L hip, L LE homeostatics and added NMR/NMES to L5-S1, and L LE homeostatics 10 min;   Precautions:  Exercises: 5  Exercise Reps/ Time Weight/ Level Issued for HEP   Comments   Prone             Over 2 pillows with hips shifted R  x5'    x       L sidelying over 3 pillows  x3'                        Supine             March with breathing x10     x    Deadbug alternating LE out straight x20  x x Hover LE   Ppt w/LE circles 8ea  x x cw/ccw   Bent knee fall out x10ea    x       Sidelying             Clams 90/30 deg             1/2 side plank w/ hip abd 2x10   x       Quadruped        Alt LE x20  x     Forward rock forearms on SB  x15       Bird dog  Verbal review  x     Gym             Tippy twist x10  x     Single leg stand Supination expression  2 sets  x     Resisted hip extension 2x10 lime x     Resisted diagonal hip extension 2x10 lime x     Resisted hip flex  2x10  lime x      Other:     Specific Instructions for next treatment:Dry needling for L quadratus lumborum, pt to continue HEP on her own next week and follow up 6/28 for recheck      Treatment Charges: Mins Units   []  Modalities     [x]  Ther Exercise 5 NC   [x]  Manual Therapy 40 3   []  Ther Activities     [x] DN 5 NC   []  Vasocompression     [x]  NMR 10 1   Total Treatment time 60 4       Assessment: [x] Progressing toward goals. Added CST/additional MFR this date with good tolerance. Added LE circles to HEP. Rev of self-correction for pelvic alignment along. She no longer is in a posterior pelvic tilt in sitting or standing. Pt's L glute med tests at 4/5 R glute med 4+/5. She fatigues very quickly with single leg standing exercises on the L then feels quadratus lumborum kick in. 5 reps was the limit on single leg wall squat. Will continue to strengthen core and glutes. Will continue w/trial of DN w/NMES next visit if patient has improvement in symptoms to L QL to see if that can relieve any symptoms. [] No change.      [] Other:  [x] Patient would continue to benefit from skilled physical therapy services in order to: Correct pelvic, thoracic and rib dysfunction as well as gastroc, soleus and glute max strength deficits to correct running mechanics and allow pt to train as she wishes    STG: (to be met in 5 treatments)  1. ? Pain:<3/10 L LE to allow pt to continue to train  2. ? ROM: Normal L great toe extension and ankle DF to allow for normal LE mechanics  3. ? Strength:5/5 glute max strength  4. ? Function:Able to hold corrections made at pelvis and be able to hold ankle neutral during single leg exercises that don't involve impact   5. Independent with Home Exercise Programs     LTG: (to be met in 12 treatments)  1. No pain L LE to allow pt to run uninhibited   2. Able to perform SL squat and 20 reps of SL HR without valgus collapse at foot, ankle or knee  3. UWRI <5% limitation   4. Able to run as she wishes                    Patient goals:Decrease pain    Pt. Education:  [x] Yes  [] No  [] Reviewed Prior HEP/Ed  Method of Education: [x] Verbal  [x] Demo  [] Written  Get prone over pillows with hips shifted R to reduce nerve irritation as much as possible  Comprehension of Education:  [x] Verbalizes understanding. [] Demonstrates understanding. [] Needs review. [] Demonstrates/verbalizes HEP/Ed previously given.      Plan: [x] Continue current frequency toward long and short term goals w/ added treatment prn.    [] Specific Instructions for subsequent treatments:     Time In:  0800     Time Out: 0905    Electronically signed by:  Steve So PT

## 2022-06-28 ENCOUNTER — HOSPITAL ENCOUNTER (OUTPATIENT)
Dept: PHYSICAL THERAPY | Facility: CLINIC | Age: 42
Setting detail: THERAPIES SERIES
Discharge: HOME | End: 2022-06-28
Payer: COMMERCIAL

## 2022-06-28 PROCEDURE — 97140 MANUAL THERAPY 1/> REGIONS: CPT

## 2022-06-28 PROCEDURE — 97110 THERAPEUTIC EXERCISES: CPT

## 2022-06-28 NOTE — FLOWSHEET NOTE
[] [facility] [address]  [address]  [phone]  F: [phone] [] [address]  [place] [address]   [address]  P: [phone]  F: [phone] [x] [place] &  Therapy  [address]  P: [phone]  F: [phone] [] [address]  P: [phone]  F: [phone] [] [address]  [facility]   [address]   [place]: [phone]  F: [phone]      Physical Therapy Daily Treatment Note    Date:  2022  Patient Name:  Lara Jorgensen    :  1980  MRN: [id number]  Physician: Dr. Ramirez : [facility]  Medical Diagnosis:   L buttock pain, L hamstring                Rehab Codes: R26.89, M76.01  Onset date: 22                                         Next 's appt.:22  Visit# / total visits:13     Cancels/No Shows: 0    Subjective:    Pain:  [x] Yes  [] No Location: L SI Pain Rating: (0-10 scale) 0/10 L great toe tingling  Pain altered Tx:  [] No  [] Yes  Action:  Comments:Pt reports she got temporary relief from IDN and ART. Feels like being a  and not being able to change position is the issue because when she in passenger seat the tingling is not as provoked.      Objective:  Manual:10min DI L glute med, QL  Precautions:  Exercises:   Exercise Reps/ Time Weight/ Level Issued for HEP   Comments   Prone             Over 2 pillows with hips shifted R  x5'    x       L sidelying over 3 pillows  x3'                        Supine             SB Bridge LE straight  2x10   x    Bridge Hover w/HR & prince knee flex  10x10\"    x    Deadbug alternating LE out straight x20  x x Hover LE   Ppt w/LE circles 10ea  x x cw/ccw   Bent knee fall out x10ea    x       Sidelying             Foot on wall Heel raise with prince HS      x  x  Cue to get to great toe for improved glute contraction   1/2 side plank w/ hip abd 2x10          Quadruped        Alt LE x20       Forward rock forearms on SB  x15   x    Bird dog  2x10  x x    Gym             Tippy twist x10  x     Single leg stand Supination expression  2 sets  x     Resisted hip extension 2x10 lime x     Resisted diagonal hip extension 2x10 lime x     Resisted hip flex  2x10  lime x      Other:     Specific Instructions for next treatment:      Treatment Charges: Mins Units   []  Modalities     [x]  Ther Exercise 30 2   [x]  Manual Therapy 10 1   []  Ther Activities     [x] DN     []  Vasocompression     []  NMR     Total Treatment time 40 3       Assessment: [x] Progressing toward goals. Mild increase in paresthesia at top of foot post bridging on SB. Pt begins to lose neutral pelvic position after a few reps of bird dog. Can correct with manual cues. Shifted to lying down for improved glute activation without QL compensation. Issued picture and written instruction on these isometrics. - Patient would continue to benefit from skilled physical therapy services in order to: Correct pelvic, thoracic and rib dysfunction as well as gastroc, soleus and glute max strength deficits to correct running mechanics and allow pt to train as she wishes    STG: (to be met in 5 treatments)  1. ? Pain:<3/10 L LE to allow pt to continue to train  2. ? ROM: Normal L great toe extension and ankle DF to allow for normal LE mechanics  3. ? Strength:5/5 glute max strength  4. ? Function:Able to hold corrections made at pelvis and be able to hold ankle neutral during single leg exercises that don't involve impact   5. Independent with Home Exercise Programs     LTG: (to be met in 12 treatments)  1. No pain L LE to allow pt to run uninhibited   2. Able to perform SL squat and 20 reps of SL HR without valgus collapse at foot, ankle or knee  3. UWRI <5% limitation   4.  Able to run as she wishes                    Patient goals:Decrease pain    Pt. Education:  [x] Yes  [] No  [] Reviewed Prior HEP/Ed  Method of Education: [x] Verbal  [x] Demo  [] Written  Get prone over pillows with hips shifted R to reduce nerve irritation as much as possible  Comprehension of Education:  [x] Verbalizes understanding. [] Demonstrates understanding. [] Needs review. [] Demonstrates/verbalizes HEP/Ed previously given.      Plan: [x] Continue current frequency toward long and short term goals w/ added treatment prn.    [] Specific Instructions for subsequent treatments:     Time In:  1110    Time Out: 1150    Electronically signed by:  Elif Bacon, PT

## 2022-07-05 ENCOUNTER — HOSPITAL ENCOUNTER (OUTPATIENT)
Dept: PHYSICAL THERAPY | Facility: CLINIC | Age: 42
Setting detail: THERAPIES SERIES
Discharge: HOME | End: 2022-07-05
Payer: COMMERCIAL

## 2022-07-05 PROCEDURE — 97110 THERAPEUTIC EXERCISES: CPT

## 2022-07-05 PROCEDURE — 97140 MANUAL THERAPY 1/> REGIONS: CPT

## 2022-07-05 NOTE — FLOWSHEET NOTE
[] [address]  [address]  [phone]  F: [phone] [] [address]  [facility]   [address]  P: [phone]  F: [phone] [x] [place] &  Therapy  [address]  P: [phone]  F: [phone] [] [address]  P: [phone]  F: [phone] [] [address]  [facility]   [address]   [place]: [phone]  F: [phone]      Physical Therapy Daily Treatment Note    Date:  2022  Patient Name:  Ray Lea    :  1980  MRN: [id number]  Physician: Dr. Fidelina Montoya: [facility]  Medical Diagnosis:   L buttock pain, L hamstring                Rehab Codes: R26.89, M76.01  Onset date: 22                                         Next 's appt.:22  Visit# / total visits:14    Cancels/No Shows: 0    Subjective:    Pain:  [x] Yes  [] No Location: L SI Pain Rating: (0-10 scale) 0/10 L sole of foot tingling  Pain altered Tx:  [] No  [] Yes  Action:  Comments:Pt reports she sat at a concert and stood a lot and the tingling increased and was just feeling horrible in the entire L LE again.       Objective:  Manual:15min DI L glute med, QL, piriformis, QF, Hip flexor  Precautions:  Exercises:   Exercise Reps/ Time Weight/ Level Issued for HEP   Comments   Elliptical warm up 10' L2  x    Prone             Over 2 pillows with hips shifted R  x5'    x       L sidelying over 3 pillows  x3'                        Supine             SB Bridge LE straight  2x10       Bridge Hover w/HR & prince knee flex  10x10\"    x    Deadbug alternating LE out straight x20  x  Hover LE   Ppt w/LE circles 10ea  x x cw/ccw   Bent knee fall out x10ea    x       Sidelying             Foot on wall allow pt to run uninhibited   2. Able to perform SL squat and 20 reps of SL HR without valgus collapse at foot, ankle or knee  3. UWRI <5% limitation   4. Able to run as she wishes                    Patient goals:Decrease pain    Pt. Education:  [x] Yes  [] No  [] Reviewed Prior HEP/Ed  Method of Education: [x] Verbal  [x] Demo  [] Written  Get prone over pillows with hips shifted R to reduce nerve irritation as much as possible  Comprehension of Education:  [x] Verbalizes understanding. [] Demonstrates understanding. [] Needs review. [] Demonstrates/verbalizes HEP/Ed previously given.      Plan: [x] Continue current frequency toward long and short term goals w/ added treatment prn.    [] Specific Instructions for subsequent treatments:     Time In:  1235    Time Out: 1330    Electronically signed by:  Fred Currie, PT

## 2022-07-18 ENCOUNTER — HOSPITAL ENCOUNTER (OUTPATIENT)
Dept: PHYSICAL THERAPY | Facility: CLINIC | Age: 42
Setting detail: THERAPIES SERIES
Discharge: HOME | End: 2022-07-18
Payer: COMMERCIAL

## 2022-07-18 PROCEDURE — 97140 MANUAL THERAPY 1/> REGIONS: CPT

## 2022-07-18 PROCEDURE — 97112 NEUROMUSCULAR REEDUCATION: CPT

## 2022-07-18 NOTE — FLOWSHEET NOTE
[] [facility] [address]  [address]  [phone]  F: [phone] [] [address]  [facility] 36   [address]  P: [phone]  F: [phone] [x] [place] &  Therapy  [address] [facility] [address] [address] [facility]  P: [phone]  F: [phone] [] [address]  P: [phone]  F: [phone] [] [address]  [facility]   [address]   [place]: [phone]  F: [phone]      Physical Therapy Daily Treatment Note    Date:  2022  Patient Name:  Dixie Wise    :  1980  MRN: [id number]  Physician: Dr. July Merrill: [facility]  Medical Diagnosis:   L buttock pain, L hamstring                Rehab Codes: R26.89, M76.01  Onset date: 22                                         Next 's appt.:22  Visit# / total visits:15     Cancels/No Shows: 0    Subjective:    Pain:  [x] Yes  [] No Location: L SI Pain Rating: (0-10 scale)8/10  Pain altered Tx:  [] No  [] Yes  Action:  Comments:Pt reports she had her injection a couple of weeks ago and her pain has been much worse. Her numbness and tingling is now from her calf down and she was having pain in the front of her leg as well as her back/S-I joint. She had to call off work a couple of days last week because she couldn't feel her L LE. She states she spent a lot of time laying on her R side. States she hasn't been doing any of her HEP due to increased pain. Objective: L ant innominate  Manual:45 min  Resumed ART/MFR to L QL, added MET for innominate along with continued CST/. MFR including pelvic diaphragm release, thoracic diaphragm release, t-inlet release, still points, added DN (5 min) to L-S Spine, L hip, L LE homeostatics and added NMR/NMES to L5-S1, and L LE homeostatics 10 min;   Precautions:  Exercises: 5  Exercise Reps/ Time Weight/ Level Issued for HEP   Comments   Prone             Over 2 pillows with hips shifted R  x5'    x       L sidelying over 3 pillows  x3'                        Supine             March with breathing x10     x    Deadbug alternating LE out straight x20  x x Hover LE   Ppt w/LE circles 8ea  x x cw/ccw   Bent knee fall out x10ea    x       Sidelying             Clams 90/30 deg             1/2 side plank w/ hip abd 2x10   x       Quadruped        Alt LE x20  x     Forward rock forearms on SB  x15       Bird dog  Verbal review  x     Gym             Tippy twist x10  x     Single leg stand Supination expression  2 sets  x     Resisted hip extension 2x10 lime x     Resisted diagonal hip extension 2x10 lime x     Resisted hip flex  2x10  lime x      Other:     Specific Instructions for next treatment: Manual prn, along with DN with NIS/NMES prn to L LE, ex progression as indicated      Treatment Charges: Mins Units   []  Modalities     [x]  Ther Exercise 5 NC   [x]  Manual Therapy 45 3   []  Ther Activities     [x] DN 5 NC   []  Vasocompression     [x]  NMR 10 1   Total Treatment time 65 4       Assessment: [x] Progressing toward goals. Exacerbation of symptoms post-injection. Resumed CST/additional MFR this date with good tolerance. Rev of self-correction for pelvic alignment as well as utilizing breathing and visualization for relaxation. Pt's L glute med tests at 4/5 R glute med 4+/5. Will continue to with manual and DN/NMES as needed and continue to strengthen core and glutes as indicated. [] No change. [] Other:  [x] Patient would continue to benefit from skilled physical therapy services in order to: Correct pelvic, thoracic and rib dysfunction as well as gastroc, soleus and glute max strength deficits to correct running mechanics and allow pt to train as she wishes    STG: (to be met in 5 treatments)  ?  Pain:<3/10 L LE to allow pt to continue to train  ? ROM: Normal L great toe extension and ankle DF to allow for normal LE mechanics  ? Strength:5/5 glute max strength  ? Function:Able to hold corrections made at pelvis and be able to hold ankle neutral during single leg exercises that don't involve impact   Independent with Home Exercise Programs     LTG: (to be met in 12 treatments)  No pain L LE to allow pt to run uninhibited   Able to perform SL squat and 20 reps of SL HR without valgus collapse at foot, ankle or knee  UWRI <5% limitation   Able to run as she wishes                    Patient goals:Decrease pain    Pt. Education:  [x] Yes  [] No  [] Reviewed Prior HEP/Ed  Method of Education: [x] Verbal  [x] Demo  [] Written  Get prone over pillows with hips shifted R to reduce nerve irritation as much as possible  Comprehension of Education:  [x] Verbalizes understanding. [] Demonstrates understanding. [] Needs review. [] Demonstrates/verbalizes HEP/Ed previously given.      Plan: [x] Continue current frequency toward long and short term goals w/ added treatment prn.    [] Specific Instructions for subsequent treatments:     Time In:  0800     Time Out: 0910    Electronically signed by:  Sandra Pantoja, PT

## 2022-07-25 ENCOUNTER — HOSPITAL ENCOUNTER (OUTPATIENT)
Dept: PHYSICAL THERAPY | Facility: CLINIC | Age: 42
Setting detail: THERAPIES SERIES
Discharge: HOME | End: 2022-07-25
Payer: COMMERCIAL

## 2022-07-25 PROCEDURE — 97112 NEUROMUSCULAR REEDUCATION: CPT

## 2022-07-25 PROCEDURE — 97140 MANUAL THERAPY 1/> REGIONS: CPT

## 2022-07-25 NOTE — FLOWSHEET NOTE
[] [address]  [address]  [phone]  F: [phone] [] [address]  [facility]   [address]  P: [phone]  F: [phone] [x] [place] [facility]  Therapy  [address] [facility]  P: [phone]  F: [phone] [] [address]  P: [phone]  F: [phone] [] [address]  [facility]   [address]   [place]: [phone]  F: [phone]      Physical Therapy Daily Treatment Note    Date:  2022  Patient Name:  [facility]    :  1980  MRN: [id number]  Physician: Dr. Lui Bartlett: [facility]  Medical Diagnosis:   L buttock pain, L hamstring                Rehab Codes: R26.89, M76.01  Onset date: 22                                         Next 's appt.:22  Visit# / total visits:16     Cancels/No Shows: 0    Subjective:    Pain:  [x] Yes  [] No Location: L SI Pain Rating: (0-10 scale) 4/10  Pain altered Tx:  [] No  [] Yes  Action:  Comments:Pt reports she is still having leg pain and isn't quite back to where she was prior to her injection. Her numbness and tingling continue from her calf down. She has been off work since last Wed and is working only 1 day this week. Able to get some of her HEP done this time. Objective: L ant innominate  Manual:40 min  Resumed ART/MFR to L QL, MET for innominate along with continued CST/. MFR including pelvic diaphragm release, thoracic diaphragm release, t-inlet release, still points, added DN (5 min) to L-S Spine, L hip, L LE homeostatics and added NMR/NMES to L5-S1, and L LE homeostatics 10 min;   Precautions:  Exercises: 5  Exercise Reps/ Time Weight/ Level Issued for HEP   Comments   Prone             Over 2 pillows with hips shifted R  x5'    x       L sidelying over 3 pillows  x3'                        Supine             March with breathing x10     x    Deadbug alternating LE out straight x20  x x Hover LE   Ppt w/LE circles 8ea  x x cw/ccw   Bent knee fall out x10ea    x       Sidelying             Clams 90/30 deg             1/2 side plank w/ hip abd 2x10   x       Quadruped        Alt LE x20  x     Forward rock forearms on SB  x15       Bird dog  Verbal review  x     Gym             Tippy twist x10  x     Single leg stand Supination expression  2 sets  x     Resisted hip extension 2x10 lime x     Resisted diagonal hip extension 2x10 lime x     Resisted hip flex  2x10  lime x      Other:     Specific Instructions for next treatment: Manual prn, along with DN with NIS/NMES prn to L LE, ex progression as indicated      Treatment Charges: Mins Units   []  Modalities     [x]  Ther Exercise 5 NC   [x]  Manual Therapy 40 3   []  Ther Activities     [x] DN 5 NC   []  Vasocompression     [x]  NMR 10 1   Total Treatment time 60 4       Assessment: [x] Progressing toward goals. Pt reported no symptoms post-treatment in R sidelying. Continued CST/additional MFR this date with good tolerance. Rev of self-correction for pelvic alignment as well as utilizing breathing and visualization for relaxation. Pt's L glute med tests at 4/5 R glute med 4+/5. Will continue to with manual and DN/NMES as needed and continue to strengthen core and glutes as indicated. [] No change. [] Other:  [x] Patient would continue to benefit from skilled physical therapy services in order to: Correct pelvic, thoracic and rib dysfunction as well as gastroc, soleus and glute max strength deficits to correct running mechanics and allow pt to train as she wishes    STG: (to be met in 5 treatments)  ? Pain:<3/10 L LE to allow pt to continue to train  ? ROM: Normal L great toe extension and ankle DF to allow for normal LE mechanics  ?  Strength:5/5 glute max strength  ? Function:Able to hold corrections made at pelvis and be able to hold ankle neutral during single leg exercises that don't involve impact   Independent with Home Exercise Programs     LTG: (to be met in 12 treatments)  No pain L LE to allow pt to run uninhibited   Able to perform SL squat and 20 reps of SL HR without valgus collapse at foot, ankle or knee  UWRI <5% limitation   Able to run as she wishes                    Patient goals:Decrease pain    Pt. Education:  [x] Yes  [] No  [] Reviewed Prior HEP/Ed  Method of Education: [x] Verbal  [x] Demo  [] Written  Get prone over pillows with hips shifted R to reduce nerve irritation as much as possible  Comprehension of Education:  [x] Verbalizes understanding. [] Demonstrates understanding. [] Needs review. [] Demonstrates/verbalizes HEP/Ed previously given.      Plan: [x] Continue current frequency toward long and short term goals w/ added treatment prn.    [] Specific Instructions for subsequent treatments:     Time In:  0800     Time Out: 0900    Electronically signed by:  Ana Posey PT

## 2022-07-28 ENCOUNTER — HOSPITAL ENCOUNTER (OUTPATIENT)
Dept: PHYSICAL THERAPY | Facility: CLINIC | Age: 42
Setting detail: THERAPIES SERIES
Discharge: HOME | End: 2022-07-28
Payer: COMMERCIAL

## 2022-07-28 PROCEDURE — 97112 NEUROMUSCULAR REEDUCATION: CPT

## 2022-07-28 PROCEDURE — 97140 MANUAL THERAPY 1/> REGIONS: CPT

## 2022-07-28 NOTE — FLOWSHEET NOTE
[] [facility]  [address]  [phone]  F: [phone] [] [address]  [facility] 36   [address]  P: [phone]  F: [phone] [x] [place] [facility]  [address]  P: [phone]  F: [phone] [] [address]  P: [phone]  F: [phone] [] [address]  [facility]   [address]   [place]: [phone]  F: [phone]      Physical Therapy Daily Treatment Note    Date:  2022  Patient Name:  Melva Vázquez    :  1980  MRN: [id number]  Physician: Dr. Lindy Clarke: [facility]  Medical Diagnosis:   L buttock pain, L hamstring                Rehab Codes: R26.89, M76.01  Onset date: 22                                         Next 's appt.:22  Visit# / total visits:17     Cancels/No Shows: 0    Subjective:    Pain:  [x] Yes  [] No Location: L SI Pain Rating: (0-10 scale) 4/10  Pain altered Tx:  [] No  [] Yes  Action:  Comments:Pt reports her pain has been more in her L S-I and piriformis since last session and less in her leg. Her numbness and tingling continue from her calf down and on the anterior side of her foot this time. She worked Monday and is off until next week, which has been helpful. Noticed holding a friend's baby when they were out to eat and had pain in her R S-I after that. Objective: L ant innominate  Manual:40 min  Resumed ART/MFR to L QL, MET for innominate along with continued CST/. MFR including pelvic diaphragm release, thoracic diaphragm release, t-inlet release, still points, added DN (5 min) to L-S Spine, L hip, L LE homeostatics and added NMR/NMES to L5-S1, and L LE homeostatics 10 min;   Precautions:  Exercises: 5  Exercise Reps/ Time Weight/ Level Issued for HEP   Comments   Prone             Over 2 pillows with hips shifted R  x5'    x       L sidelying over 3 pillows  x3'                        Supine             March with breathing x10     x    Deadbug alternating LE out straight x20  x x Hover LE   Ppt w/LE circles 8ea  x x cw/ccw   Bent knee fall out x10ea   x       Sidelying             Clams 90/30 deg             1/2 side plank w/ hip abd 2x10   x       Quadruped        Alt LE x20  x     Forward rock forearms on SB  x15       Bird dog  Verbal review  x     Gym             Tippy twist x10  x     Single leg stand Supination expression  2 sets  x     Resisted hip extension 2x10 lime x     Resisted diagonal hip extension 2x10 lime x     Resisted hip flex  2x10  lime x      Other:     Specific Instructions for next treatment: Manual prn, along with DN with NIS/NMES prn to L LE, ex progression as indicated      Treatment Charges: Mins Units   []  Modalities     [x]  Ther Exercise 5 NC   [x]  Manual Therapy 40 3   []  Ther Activities     [x] DN 5 NC   []  Vasocompression     [x]  NMR 10 1   Total Treatment time 60 4       Assessment: [x] Progressing toward goals. Pt reported improved symptoms overall with working less. Continued CST/additional MFR this date with good tolerance. Rev of self-correction for pelvic alignment as well as utilizing breathing and visualization for relaxation. Pt's L glute med tests at 4/5 R glute med 4+/5. Will continue to with manual and DN/NMES as needed and continue to strengthen core and glutes as indicated. [] No change. [] Other:  [x] Patient would continue to benefit from skilled physical therapy services in order to: Correct pelvic, thoracic and rib dysfunction as well as gastroc, soleus and glute max strength deficits to correct running mechanics and allow pt to train as she wishes    STG: (to be met in 5 treatments)  ? Pain:<3/10 L LE to allow pt to continue to train  ?  ROM: Normal L great toe extension and ankle DF to allow for normal LE mechanics  ? Strength:5/5 glute max strength  ? Function:Able to hold corrections made at pelvis and be able to hold ankle neutral during single leg exercises that don't involve impact   Independent with Home Exercise Programs     LTG: (to be met in 12 treatments)  No pain L LE to allow pt to run uninhibited   Able to perform SL squat and 20 reps of SL HR without valgus collapse at foot, ankle or knee  UWRI <5% limitation   Able to run as she wishes                    Patient goals:Decrease pain    Pt. Education:  [x] Yes  [] No  [] Reviewed Prior HEP/Ed  Method of Education: [x] Verbal  [x] Demo  [] Written  Get prone over pillows with hips shifted R to reduce nerve irritation as much as possible  Comprehension of Education:  [x] Verbalizes understanding. [] Demonstrates understanding. [] Needs review. [] Demonstrates/verbalizes HEP/Ed previously given.      Plan: [x] Continue current frequency toward long and short term goals w/ added treatment prn.    [] Specific Instructions for subsequent treatments:     Time In:  0700    Time Out: 0800    Electronically signed by:  Sandra Pantoja PT

## 2022-08-02 ENCOUNTER — HOSPITAL ENCOUNTER (OUTPATIENT)
Dept: PHYSICAL THERAPY | Facility: CLINIC | Age: 42
Setting detail: THERAPIES SERIES
Discharge: HOME | End: 2022-08-02
Payer: COMMERCIAL

## 2022-08-02 PROCEDURE — 97140 MANUAL THERAPY 1/> REGIONS: CPT

## 2022-08-02 PROCEDURE — 97112 NEUROMUSCULAR REEDUCATION: CPT

## 2022-08-02 NOTE — FLOWSHEET NOTE
[] [facility] [facility]  [address]  [phone]  F: [phone] [] [address]  [facility]   [address]  P: [phone]  F: [phone] [x] [place] [facility]  [address]  P: [phone]  F: [phone] [] [address]  P: [phone]  F: [phone] [] [address]  [facility]   [address]   [place]: [phone]  F: [phone]      Physical Therapy Daily Treatment Note    Date:  2022  Patient Name:  Bernard Redman    :  1980  MRN: [id number]  Physician: Dr. Gilmar Galvan: [facility]  Medical Diagnosis:   L buttock pain, L hamstring                Rehab Codes: R26.89, M76.01  Onset date: 22                                         Next 's appt.:22  Visit# / total visits:18     Cancels/No Shows: 0    Subjective:    Pain:  [x] Yes  [] No Location: L SI Pain Rating: (0-10 scale) 5/10  Pain altered Tx:  [] No  [] Yes  Action:  Comments:Pt reports her pain has been a little better, however, she swam and tried to do the breast stroke and her piriformis was irritated. She then tried to run on the road and it was sore after. She notes she did run on the treadmill a couple of times for 10 minutes before it was irritated and it wasn't too bad. She is working 3 days this week. Objective: L ant innominate  Manual:40 min  Resumed ART/MFR to L QL, MET for innominate along with continued CST/. MFR including pelvic diaphragm release, thoracic diaphragm release, t-inlet release, still points, added DN (5 min) to L-S Spine, L hip, L LE homeostatics and added NMR/NMES to L5-S1, and L LE homeostatics 10 min   Precautions:  Exercises: 5  Exercise Reps/ Time Weight/ Level Issued for HEP   Comments   Prone             Over 2 pillows with hips shifted R  x5'    x       L sidelying over 3 pillows  x3'           Press ups  10  5\"    X  HEP inst   Supine             March with breathing x10         Deadbug alternating LE out straight x20  x  Hover LE   Ppt w/LE circles 8ea  x x cw/ccw   Bent knee fall out x10ea   x       Sidelying             Clams 90/30 deg             1/2 side plank w/ hip abd 2x10   x       Quadruped        Alt LE x20  x     Forward rock forearms on SB  x15       Bird dog  Verbal review  x     Gym             Tippy twist x10  x     Single leg stand Supination expression  2 sets  x     Resisted hip extension 2x10 lime x     Resisted diagonal hip extension 2x10 lime x     Resisted hip flex  2x10  lime x      Other:     Specific Instructions for next treatment: Manual prn, along with DN with NIS/NMES prn to L LE, ex progression as indicated      Treatment Charges: Mins Units   []  Modalities     [x]  Ther Exercise 5 NC   [x]  Manual Therapy 40 3   []  Ther Activities     [x] DN 5 NC   []  Vasocompression     [x]  NMR 10 1   Total Treatment time 60 4       Assessment: [x] Progressing toward goals. Pt reported improved symptoms overall with treatment. Noted less foot symptoms with extension. Encouraged pt to continue extension on a regular basis at home and hold off/go easy on running until she follows up with Bob Macario, PT  Continued CST/additional MFR this date with good tolerance. Rev of self-correction for pelvic alignment as well as utilizing breathing and visualization for relaxation. Pt's L glute med tests at 4/5 R glute med 4+/5. Will continue to with manual and DN/NMES as needed and continue to strengthen core and glutes as indicated. [] No change.      [] Other:  [x] Patient would continue to benefit from skilled physical therapy services in order to: Correct pelvic, thoracic and rib dysfunction as well as gastroc, soleus and glute max strength deficits to correct running mechanics and allow pt to train as she wishes    STG: (to be met in 5 treatments)  ? Pain:<3/10 L LE to allow pt to continue to train  ? ROM: Normal L great toe extension and ankle DF to allow for normal LE mechanics  ? Strength:5/5 glute max strength  ? Function:Able to hold corrections made at pelvis and be able to hold ankle neutral during single leg exercises that don't involve impact   Independent with Home Exercise Programs     LTG: (to be met in 12 treatments)  No pain L LE to allow pt to run uninhibited   Able to perform SL squat and 20 reps of SL HR without valgus collapse at foot, ankle or knee  UWRI <5% limitation   Able to run as she wishes                    Patient goals:Decrease pain    Pt. Education:  [x] Yes  [] No  [] Reviewed Prior HEP/Ed  Method of Education: [x] Verbal  [x] Demo  [] Written  Get prone over pillows with hips shifted R to reduce nerve irritation as much as possible  Comprehension of Education:  [x] Verbalizes understanding. [] Demonstrates understanding. [] Needs review. [] Demonstrates/verbalizes HEP/Ed previously given.      Plan: [x] Continue current frequency toward long and short term goals w/ added treatment prn.    [] Specific Instructions for subsequent treatments:     Time In:  0700    Time Out: 0800    Electronically signed by:  Tiago Luna PT

## 2022-08-09 ENCOUNTER — HOSPITAL ENCOUNTER (OUTPATIENT)
Dept: PHYSICAL THERAPY | Facility: CLINIC | Age: 42
Setting detail: THERAPIES SERIES
Discharge: HOME | End: 2022-08-09
Payer: COMMERCIAL

## 2022-08-09 PROCEDURE — 97110 THERAPEUTIC EXERCISES: CPT

## 2022-08-09 PROCEDURE — 97140 MANUAL THERAPY 1/> REGIONS: CPT

## 2022-08-09 NOTE — FLOWSHEET NOTE
[] [facility]) [facility]  [address]  [phone]  F: [phone] [] [address]  [facility]   [address]  P: [phone]  F: [phone] [x] [place] [facility]  [address]  P: [phone]  F: [phone] [] [address]  P: [phone]  F: [phone] [] [address]  [facility]   [address]   [place]: [phone]  F: [phone]      Physical Therapy Daily Treatment Note    Date:  2022  Patient Name:  Josias Lynch    :  1980  MRN: [id number]  Physician: Dr. Sandra Godinez: [facility]  Medical Diagnosis:   L buttock pain, L hamstring                Rehab Codes: R26.89, M76.01  Onset date: 22                                         Next 's appt.:22  Visit# / total visits:189    Cancels/No Shows: 0    Subjective:    Pain:  [x] Yes  [] No Location: L SI Pain Rating: (0-10 scale) 5/10  Pain altered Tx:  [] No  [] Yes  Action:  Comments:Pt reports she is doing better. Walking 30'x3 per day. Watching posture.      Objective: L ant innominate  Manual:DI L glute med , L piriformis, L psoas proximal and distal.  Precautions:  Exercises:   Exercise Reps/ Time Weight/ Level Issued for HEP   Comments   Prone             Over 2 pillows with hips shifted R  x5'    x       L sidelying over 3 pillows  x3'           Press ups  10  5\"      HEP inst   Supine             March with breathing x10         Deadbug alternating LE out straight x20  x  Hover LE   Ppt w/LE circles 8ea  x x cw/ccw   Posterior chain activation 10x10\"    x     Sidelying             Reverse Clams   2 sets  orange    x     1/2 side plank w/ hip abd 2x10   x       Quadruped        Alt LE x20  x     Fire hydrant x20   x    Bird dog  x20  x x    Gym             RDL 1 set Purple KB  x Hand for balance   Lateral step down 1 set 4\"  x                             Other:     Specific Instructions for next treatment: Manual prn, along with DN with NIS/NMES prn to L LE, ex progression as indicated      Treatment Charges: Mins Units   []  Modalities     [x]  Ther Exercise 30 2   []  Manual Therapy 15 1   []  Ther Activities     [] DN     []  Vasocompression     []  NMR     Total Treatment time 45 3       Assessment: [x] Progressing toward goals. Mildly tight in hip extension. No IR limitations but does have spasm. R LE more coordinated for [place] exercise. Fatigues quickly at B LE with single leg standing exercises but maintains neutral spine throughout. Cues for transverse plane deviation wtiht single leg exercises. Overall holding posture with more success just need to continue to strengthen at glute med, glute max and piriformis. [] No change. [] Other:  [x] Patient would continue to benefit from skilled physical therapy services in order to: Correct pelvic, thoracic and rib dysfunction as well as gastroc, soleus and glute max strength deficits to correct running mechanics and allow pt to train as she wishes    STG: (to be met in 5 treatments)  ? Pain:<3/10 L LE to allow pt to continue to train  ? ROM: Normal L great toe extension and ankle DF to allow for normal LE mechanics  ? Strength:5/5 glute max strength  ? Function:Able to hold corrections made at pelvis and be able to hold ankle neutral during single leg exercises that don't involve impact   Independent with Home Exercise Programs     LTG: (to be met in 12 treatments)  No pain L LE to allow pt to run uninhibited   Able to perform SL squat and 20 reps of SL HR without valgus collapse at foot, ankle or knee  UWRI <5% limitation   Able to run as she wishes                    Patient goals:Decrease pain    Pt.  Education:  [x] Yes  [] No  [] Reviewed Prior HEP/Ed  Method of Education: [x] Verbal  [x] Demo  [] Written  Get prone over pillows with hips shifted R to reduce nerve irritation as much as possible  Comprehension of Education:  [x] Verbalizes understanding. [] Demonstrates understanding. [] Needs review. [] Demonstrates/verbalizes HEP/Ed previously given.      Plan: [x] Continue current frequency toward long and short term goals w/ added treatment prn.    [] Specific Instructions for subsequent treatments:     Time In: 1035   Time Out: 1120    Electronically signed by:  Tami Mckeon PT

## 2022-08-11 ENCOUNTER — HOSPITAL ENCOUNTER (OUTPATIENT)
Dept: PHYSICAL THERAPY | Facility: CLINIC | Age: 42
Setting detail: THERAPIES SERIES
Discharge: HOME | End: 2022-08-11
Payer: COMMERCIAL

## 2022-08-11 PROCEDURE — 97112 NEUROMUSCULAR REEDUCATION: CPT

## 2022-08-11 PROCEDURE — 97140 MANUAL THERAPY 1/> REGIONS: CPT

## 2022-08-11 NOTE — FLOWSHEET NOTE
[] [address] [address]  [address]  [phone]  F: [phone] [] [address]  [facility]   [address]  P: [phone]  F: [phone] [x] [address]  [address]  P: [phone]  F: [phone] [] [address]  P: [phone]  F: [phone] [] [address]  [facility]   [address]   [place]: [phone]  F: [phone]      Physical Therapy Daily Treatment Note    Date:  2022  Patient Name:  Harshil Monte    :  1980  MRN: [id number]  Physician: Dr. Gutierrez Dung: [facility]  Medical Diagnosis:   L buttock pain, L hamstring                Rehab Codes: R26.89, M76.01  Onset date: 22                                         Next 's appt. : 22  Visit# / total visits:20    Cancels/No Shows: 0    Subjective:    Pain:  [x] Yes  [] No Location: L SI Pain Rating: (0-10 scale) 2/10  Pain altered Tx:  [] No  [] Yes  Action:   Comments:Pt reports her pain is better overall. Getting different sensations now. Less frequent tingling. Walked/run the last couple of days and did fine with it. Was able to work until 4am before noticing any tingling in her foot. She is working 3 days this weekend. Objective: L ant innominate  Manual:40 min  Resumed ART/MFR to L QL, L psoas, L piriformis, L glut med; MET for innominate along with continued CST/. MFR including pelvic diaphragm release, thoracic diaphragm release, t-inlet release, still points, added DN (5 min) to L-S Spine, L hip, L LE homeostatics and added NMR/NMES to L5-S1, and L LE homeostatics 10 min   Precautions:  Exercises:5  Exercise Reps/ Time Weight/ Level Issued for HEP   Comments   Prone             Over 2 pillows with hips shifted R  x5'    x       L sidelying over 3 pillows  x3'           Press ups  10  5\"    X  HEP inst   Supine             March with breathing x10         Deadbug alternating LE out straight x20  x  Hover LE   Ppt w/LE circles 8ea  x x cw/ccw   Bent knee fall out x10ea   x       Sidelying             Clams 90/30 deg             1/2 side plank w/ hip abd 2x10   x       Quadruped        Alt LE x20  x     Forward rock forearms on SB  x15       Bird dog  Verbal review  x     Gym             Tippy twist x10  x     Single leg stand Supination expression  2 sets  x     Resisted hip extension 2x10 lime x     Resisted diagonal hip extension 2x10 lime x     Resisted hip flex  2x10  lime x      Other:     Specific Instructions for next treatment: Manual prn, along with DN with NIS/NMES prn to L LE, ex progression as indicated      Treatment Charges: Mins Units   []  Modalities     [x]  Ther Exercise 5 NC   [x]  Manual Therapy 40 3   []  Ther Activities     [x] DN 5 NC   []  Vasocompression     [x]  NMR 10 1   Total Treatment time 60 4       Assessment: [x] Progressing toward goals. Pt reports improved symptoms overall with treatment. Noted less less tingling in  L LE. Able to do more work. ..etc before symptoms and tingling come on. Pt continuing the run/walk program as instructed without issues. Continued CST/additional MFR this date with good tolerance. Will continue to with manual and DN/NMES as needed and continue to strengthen core and glutes as indicated. [] No change. [] Other:  [x] Patient would continue to benefit from skilled physical therapy services in order to: Correct pelvic, thoracic and rib dysfunction as well as gastroc, soleus and glute max strength deficits to correct running mechanics and allow pt to train as she wishes    STG: (to be met in 5 treatments)  ? Pain:<3/10 L LE to allow pt to continue to train  ?  ROM: Normal L great toe extension and ankle DF to allow for normal LE mechanics  ? Strength:5/5 glute max strength  ? Function:Able to hold corrections made at pelvis and be able to hold ankle neutral during single leg exercises that don't involve impact   Independent with Home Exercise Programs     LTG: (to be met in 12 treatments)  No pain L LE to allow pt to run uninhibited   Able to perform SL squat and 20 reps of SL HR without valgus collapse at foot, ankle or knee  UWRI <5% limitation   Able to run as she wishes                    Patient goals:Decrease pain    Pt. Education:  [x] Yes  [] No  [] Reviewed Prior HEP/Ed  Method of Education: [x] Verbal  [x] Demo  [] Written  Get prone over pillows with hips shifted R to reduce nerve irritation as much as possible  Comprehension of Education:  [x] Verbalizes understanding. [] Demonstrates understanding. [] Needs review. [] Demonstrates/verbalizes HEP/Ed previously given.      Plan: [x] Continue current frequency toward long and short term goals w/ added treatment prn.    [] Specific Instructions for subsequent treatments:     Time In:  1330   Time Out: 1430    Electronically signed by:  Sadaf Yeager, PT

## 2022-08-15 ENCOUNTER — HOSPITAL ENCOUNTER (OUTPATIENT)
Dept: PHYSICAL THERAPY | Facility: CLINIC | Age: 42
Setting detail: THERAPIES SERIES
Discharge: HOME | End: 2022-08-15
Payer: COMMERCIAL

## 2022-08-15 PROCEDURE — 97140 MANUAL THERAPY 1/> REGIONS: CPT

## 2022-08-15 PROCEDURE — 97110 THERAPEUTIC EXERCISES: CPT

## 2022-08-15 PROCEDURE — 97112 NEUROMUSCULAR REEDUCATION: CPT

## 2022-08-15 NOTE — FLOWSHEET NOTE
[] [facility]  [address]  [phone]  F: [phone] [] [address]  [facility] 36   [address]  P: [phone]  F: [phone] [x] [place] [facility]  [address]  P: [phone]  F: [phone] [] [address]  P: [phone]  F: [phone] [] [address]  [facility]   [address]   [place]: [phone]  F: [phone]      Physical Therapy Daily Treatment Note    Date:  8/15/2022  Patient Name:  Ruiz Seaman    :  1980  MRN: [id number]  Physician: Dr. Petty Peoples: [facility]  Medical Diagnosis:   L buttock pain, L hamstring                Rehab Codes: R26.89, M76.01  Onset date: 22                                         Next 's appt. : 22  Visit# / total visits:21    Cancels/No Shows: 0    Subjective:    Pain:  [x] Yes  [] No Location: L SI Pain Rating: (0-10 scale) 5/10  Pain altered Tx:  [] No  [] Yes  Action:   Comments:Pt reports she had to work the last 3 nights in a row, only getting 3.5 hours of sleep a night so she is having more pain in the back of the L leg today. Walked one of the days for 25 min and didn't have any issues. Objective: L ant innominate  Manual:40 min  Resumed ART/MFR to L QL, L psoas, L piriformis, L glut med; MET for innominate along with continued CST/. MFR including pelvic diaphragm release, thoracic diaphragm release, t-inlet release, still points, added DN (5 min) to L-S Spine, L hip, L LE homeostatics and continued NMR/NMES to L5-S1, and L LE homeostatics 10 min   Precautions:  Exercises:5  Exercise Reps/ Time Weight/ Level Issued for HEP   Comments   Prone             Over 2 pillows with hips shifted R  x5'    x L sidelying over 3 pillows  x3'           Press ups  10  5\"    X  HEP inst   Supine             March with breathing x10         Deadbug alternating LE out straight x20  x  Hover LE   Ppt w/LE circles 8ea  x x cw/ccw   Bent knee fall out x10ea   x       Sidelying             Clams 90/30 deg             1/2 side plank w/ hip abd 2x10   x       Quadruped        Alt LE x20  x     Forward rock forearms on SB  x15       Bird dog  Verbal review  x     Gym             Tippy twist x10  x     Single leg stand Supination expression  2 sets  x     Resisted hip extension 2x10 lime x     Resisted diagonal hip extension 2x10 lime x     Resisted hip flex  2x10  lime x      Other:     Specific Instructions for next treatment: Manual prn, along with DN with NIS/NMES prn to L LE, ex progression as indicated      Treatment Charges: Mins Units   []  Modalities     [x]  Ther Exercise 5 NC   [x]  Manual Therapy 40 3   []  Ther Activities     [x] DN 5 NC   []  Vasocompression     [x]  NMR 10 1   Total Treatment time 60 4       Assessment: [x] Progressing toward goals. Pt had regression after having to work 3 days in a row, but improved symptoms overall with treatment. Pt continuing the run/walk program as instructed without issues. Continued CST/additional MFR this date with good tolerance. Will continue to with manual and DN/NMES as needed and continue to strengthen core and glutes as indicated. [] No change. [] Other:  [x] Patient would continue to benefit from skilled physical therapy services in order to: Correct pelvic, thoracic and rib dysfunction as well as gastroc, soleus and glute max strength deficits to correct running mechanics and allow pt to train as she wishes    STG: (to be met in 5 treatments)  ? Pain:<3/10 L LE to allow pt to continue to train  ? ROM: Normal L great toe extension and ankle DF to allow for normal LE mechanics  ? Strength:5/5 glute max strength  ?  Function:Able to hold corrections made at pelvis and be able to hold ankle neutral during single leg exercises that don't involve impact   Independent with Home Exercise Programs     LTG: (to be met in 12 treatments)  No pain L LE to allow pt to run uninhibited   Able to perform SL squat and 20 reps of SL HR without valgus collapse at foot, ankle or knee  UWRI <5% limitation   Able to run as she wishes                    Patient goals:Decrease pain    Pt. Education:  [x] Yes  [] No  [] Reviewed Prior HEP/Ed  Method of Education: [x] Verbal  [x] Demo  [] Written  Get prone over pillows with hips shifted R to reduce nerve irritation as much as possible  Comprehension of Education:  [x] Verbalizes understanding. [] Demonstrates understanding. [] Needs review. [] Demonstrates/verbalizes HEP/Ed previously given.      Plan: [x] Continue current frequency toward long and short term goals w/ added treatment prn.    [] Specific Instructions for subsequent treatments:     Time In:  0900   Time Out: 1000    Electronically signed by:  Feliciano Parham PT

## 2022-08-18 ENCOUNTER — HOSPITAL ENCOUNTER (OUTPATIENT)
Dept: PHYSICAL THERAPY | Facility: CLINIC | Age: 42
Setting detail: THERAPIES SERIES
Discharge: HOME | End: 2022-08-18
Payer: COMMERCIAL

## 2022-08-18 PROCEDURE — 97110 THERAPEUTIC EXERCISES: CPT

## 2022-08-18 PROCEDURE — 97140 MANUAL THERAPY 1/> REGIONS: CPT

## 2022-08-18 NOTE — FLOWSHEET NOTE
[] [facility] [address]  [address]  [phone]  F: [phone] [] [address]  "[employer] 36   [address]  P: [phone]  F: [phone] [x] [place] [facility] &  Therapy  [address]  P: [phone]  F: [phone] [] [address]  P: [phone]  F: [phone] [] [address]  [facility]   [address]   [place]: [phone]  F: [phone]      Physical Therapy Daily Treatment Note    Date:  2022  Patient Name:  Allegra Almonte    :  1980  MRN: [id number]  Physician: Dr. Danish Wills: [facility]  Medical Diagnosis:   L buttock pain, L hamstring                Rehab Codes: R26.89, M76.01  Onset date: 22                                         Next 's appt. : 22  Visit# / total visits:22   Cancels/No Shows: 0    Subjective:    Pain:  [x] Yes  [] No Location: L SI Pain Rating: (0-10 scale) 3/10  Pain altered Tx:  [] No  [] Yes  Action:   Comments:Pt reports she got good relief out of last treatment then went kayaking and it is tingling at lateral L calf    Objective:   Manual:DI hip flexor , glute med, pririformis, Hypervolt TFL and vastus lateralis, L paraspinals, middle and lower trap, L LE long axis pull, Pubic correction, PA glide T6-10 , MET pubic correction  Precautions:  Exercises:  Exercise Reps/ Time Weight/ Level Issued for HEP   Comments   Prone             Over 2 pillows with hips shifted R  x5'    x       L sidelying over 3 pillows  x3'           Press ups  10  5\"    X  HEP inst   Supine             March with breathing x10         Deadbug alternating LE out straight x20  x  Hover LE   Ppt w/LE circles 8ea  x  cw/ccw   Bent knee fall out x10ea   x       Sidelying Clams 90/30 deg             1/2 side plank w/ hip abd 2x10   x       Quadruped        Alt LE x20  x     1/2 plank w/KB pullthrough x15 Purple KB  x    Bird dog  Verbal review  x     Gym             Hip Hike x10   x    Palloff Press 2x10 15#  x    Single leg stand Supination expression  2 sets  x     Resisted hip extension 2x10 orange x x    Resisted diagonal hip extension 2x10 orange x x    Resisted hip flex  2x10  lime x      Other:     Specific Instructions for next treatment: Manual prn, along with DN with NIS/NMES prn to L LE, ex progression as indicated      Treatment Charges: Mins Units   []  Modalities     [x]  Ther Exercise 35 2   [x]  Manual Therapy 15 1   []  Ther Activities     [] DN     []  Vasocompression     []  NMR     Total Treatment time 50 3       Assessment: [x] Progressing toward goals. Pt is getting L LE paresthesia especially with single leg exercises. L LE stability is decreased vs R with pt going into extension at the hip and falling into anterior tilt with lumbar extension. Pt can get paresthesia to resolve with pressups. [] No change. [] Other:  [x] Patient would continue to benefit from skilled physical therapy services in order to: Correct pelvic, thoracic and rib dysfunction as well as gastroc, soleus and glute max strength deficits to correct running mechanics and allow pt to train as she wishes    STG: (to be met in 5 treatments)  ? Pain:<3/10 L LE to allow pt to continue to train  ? ROM: Normal L great toe extension and ankle DF to allow for normal LE mechanics  ? Strength:5/5 glute max strength  ?  Function:Able to hold corrections made at pelvis and be able to hold ankle neutral during single leg exercises that don't involve impact   Independent with Home Exercise Programs     LTG: (to be met in 12 treatments)  No pain L LE to allow pt to run uninhibited   Able to perform SL squat and 20 reps of SL HR without valgus collapse at foot, ankle or knee  UWRI <5% limitation   Able to run as she wishes                    Patient goals:Decrease pain    Pt. Education:  [x] Yes  [] No  [] Reviewed Prior HEP/Ed  Method of Education: [x] Verbal  [x] Demo  [] Written  Get prone over pillows with hips shifted R to reduce nerve irritation as much as possible  Comprehension of Education:  [x] Verbalizes understanding. [] Demonstrates understanding. [] Needs review. [] Demonstrates/verbalizes HEP/Ed previously given.      Plan: [x] Continue current frequency toward long and short term goals w/ added treatment prn.    [] Specific Instructions for subsequent treatments:     Time In:  1302 Time Out: 0167    Electronically signed by:  Colton Palacio, PT

## 2022-08-23 ENCOUNTER — HOSPITAL ENCOUNTER (OUTPATIENT)
Dept: PHYSICAL THERAPY | Facility: CLINIC | Age: 42
Setting detail: THERAPIES SERIES
Discharge: HOME | End: 2022-08-23
Payer: COMMERCIAL

## 2022-08-23 PROCEDURE — 97140 MANUAL THERAPY 1/> REGIONS: CPT

## 2022-08-23 PROCEDURE — 97110 THERAPEUTIC EXERCISES: CPT

## 2022-08-23 NOTE — FLOWSHEET NOTE
[] [address] [address]  [address]  [phone]  F: [phone] [] [address]  "[employer] 36   [address]  P: [phone]  F: [phone] [x] [place] &  Therapy  [address] [facility]  P: [phone]  F: [phone] [] [address]  P: [phone]  F: [phone] [] [address]  [facility]   [address]   [place]: [phone]  F: [phone]      Physical Therapy Daily Treatment Note    Date:  2022  Patient Name:  Mirtha Walton    :  1980  MRN: [id number]  Physician: Dr. Trinidad Flores: [facility] Diagnosis:   L buttock pain, L hamstring                Rehab Codes: R26.89, M76.01  Onset date: 22                                         Next Dr's appt. : 22  Visit# / total visits:23  Cancels/No Shows: 0    Subjective:    Pain:  [x] Yes  [] No Location: L SI Pain Rating: (0-10 scale) 3/10  Pain altered Tx:  [] No  [] Yes  Action:   Comments:Pt reports she did well after last time then worked 3 days in a row. Pt reports she gets to 3AM and there is no position of comfort.  Only 2.5 hrs of sleep because there is difficulty    Objective:   Manual:DI hip flexor , glute med, pririformis  Precautions:  Exercises:  Exercise Reps/ Time Weight/ Level Issued for HEP   Comments   Prone             Over 2 pillows with hips shifted R  x5'    x       L sidelying over 3 pillows  x3'           Press ups  10  5\"    X  HEP inst- overpressure   Supine             March with breathing x10         Deadbug alternating LE out straight x20  x  Hover LE   Ppt w/LE circles 8ea  x  cw/ccw   Bent knee fall out x10ea   x       Sidelying             Clams 90/30 deg             1/2 side plank w/ hip abd 2x10   x       Quadruped        Alt LE x20  x     1/2 plank w/KB pullthrough x15 Purple KB      Bird dog  Verbal review  x     Gym             Hip Hike x10       Palloff Press 2x10 15#  x    Lateral step down 2 sets 4\" x x    Resisted hip extension 2x10 lime x x    Resisted diagonal hip extension 2x10 lime x x    Resisted hip flex  2x10  lime x  x    Other:     Specific Instructions for next treatment: Manual prn, along with DN with NIS/NMES prn to L LE, ex progression as indicated      Treatment Charges: Mins Units   []  Modalities     [x]  Ther Exercise 30 2   [x]  Manual Therapy 10 1   []  Ther Activities     [] DN     []  Vasocompression     []  NMR     Total Treatment time 40 3       Assessment: [x] Progressing toward goals. Pt did have some paresthesia in L lateral calf and great toe but more in great toe reported. Pt reported at completion of exercises and manual the paresthesia is less than when she walked in. She is doing a great job of correcting transverse plane deviations but still getting frontal plane deviation with lateral step. [] No change. [] Other:  [x] Patient would continue to benefit from skilled physical therapy services in order to: Correct pelvic, thoracic and rib dysfunction as well as gastroc, soleus and glute max strength deficits to correct running mechanics and allow pt to train as she wishes    STG: (to be met in 5 treatments)  ? Pain:<3/10 L LE to allow pt to continue to train  ? ROM: Normal L great toe extension and ankle DF to allow for normal LE mechanics  ? Strength:5/5 glute max strength  ?  Function:Able to hold corrections made at pelvis and be able to hold ankle neutral during single leg exercises that don't involve impact   Independent with Home Exercise Programs     LTG: (to be met in 12 treatments)  No pain L LE to allow pt to run uninhibited   Able to perform SL squat and 20 reps of SL HR without valgus collapse at foot, ankle or knee  UWRI <5% limitation   Able to run as she wishes                    Patient goals:Decrease pain    Pt. Education:  [x] Yes  [] No  [] Reviewed Prior HEP/Ed  Method of Education: [x] Verbal  [x] Demo  [] Written  Get prone over pillows with hips shifted R to reduce nerve irritation as much as possible  Comprehension of Education:  [x] Verbalizes understanding. [] Demonstrates understanding. [] Needs review. [] Demonstrates/verbalizes HEP/Ed previously given.      Plan: [x] Continue current frequency toward long and short term goals w/ added treatment prn.    [] Specific Instructions for subsequent treatments:     Time In:  1502 Time Out: 1542    Electronically signed by:  Jennifer Jalloh, PT

## 2022-08-24 ENCOUNTER — HOSPITAL ENCOUNTER (OUTPATIENT)
Dept: PHYSICAL THERAPY | Facility: CLINIC | Age: 42
Setting detail: THERAPIES SERIES
Discharge: HOME | End: 2022-08-24
Payer: COMMERCIAL

## 2022-08-24 PROCEDURE — 97140 MANUAL THERAPY 1/> REGIONS: CPT

## 2022-08-24 PROCEDURE — 97112 NEUROMUSCULAR REEDUCATION: CPT

## 2022-08-24 NOTE — FLOWSHEET NOTE
[] [facility] [address]  [address]  [phone]  F: [phone] [] [address]  [employer] 36   [address]  P: [phone]  F: [phone] [x] [place] &  Therapy  [address] [facility]  P: [phone]  F: [phone] [] [address]  P: [phone]  F: [phone] [] [address]  [facility]   [address]   [place]: [phone]  F: [phone]      Physical Therapy Daily Treatment Note    Date:  2022  Patient Name:  Donna Kraft    :  1980  MRN: [id number]  Physician: Dr. Amarilis Hernandez: [facility]  Medical Diagnosis:   L buttock pain, L hamstring                Rehab Codes: R26.89, M76.01  Onset date: 22                                         Next 's appt. : 22  Visit# / total visits:23  Cancels/No Shows: 0    Subjective:    Pain:  [x] Yes  [] No Location: L SI Pain Rating: (0-10 scale) 3/10  Pain altered Tx:  [] No  [] Yes  Action:   Comments:Pt reports she did well after last time then worked 3 days in a row again. She just feels like the bottom of her foot is itching. She has to work another 2 days in a row again. Objective: L ant innominate  Manual:40 min  Resumed ART/MFR to L QL, L psoas, L piriformis, L glut med; MET for innominate along with continued CST/. MFR including pelvic diaphragm release, thoracic diaphragm release, t-inlet release, still points, added DN (5 min) to L-S Spine, L hip, L LE homeostatics and continued NMR/NMES to L5-S1, and L LE homeostatics 10 min   Precautions:  Exercises:  Exercise Reps/ Time Weight/ Level Issued for HEP   Comments   Prone             Over 2 pillows with hips shifted R  x5'    x       L sidelying over 3 pillows  x3' 20 reps of SL HR without valgus collapse at foot, ankle or knee  UWRI <5% limitation   Able to run as she wishes                    Patient goals:Decrease pain    Pt. Education:  [x] Yes  [] No  [] Reviewed Prior HEP/Ed  Method of Education: [x] Verbal  [x] Demo  [] Written  Get prone over pillows with hips shifted R to reduce nerve irritation as much as possible  Comprehension of Education:  [x] Verbalizes understanding. [] Demonstrates understanding. [] Needs review. [] Demonstrates/verbalizes HEP/Ed previously given.      Plan: [x] Continue current frequency toward long and short term goals w/ added treatment prn.    [] Specific Instructions for subsequent treatments:     Time In:  1000  Time Out: 1100    Electronically signed by:  Sandra Pantoja PT

## 2022-08-30 ENCOUNTER — HOSPITAL ENCOUNTER (OUTPATIENT)
Dept: PHYSICAL THERAPY | Facility: CLINIC | Age: 42
Setting detail: THERAPIES SERIES
Discharge: HOME | End: 2022-08-30
Payer: COMMERCIAL

## 2022-08-30 PROCEDURE — 97110 THERAPEUTIC EXERCISES: CPT

## 2022-08-30 PROCEDURE — 97140 MANUAL THERAPY 1/> REGIONS: CPT

## 2022-08-30 NOTE — FLOWSHEET NOTE
[] [address]  [address]  [phone]  F: [phone] [] [address]  [employer] 36   [address]  P: [phone]  F: [phone] [x] [place] [facility] &  Therapy  [address]  P: [phone]  F: [phone] [] [address]  P: [phone]  F: [phone] [] [address]  [facility]   [address]   [place]: [phone]  F: [phone]      Physical Therapy Daily Treatment Note    Date:  2022  Patient Name:  Saira Benavidez    :  1980  MRN: [id number]  Physician: Dr. Cifuentes Plate: [facility]  Medical Diagnosis:   L buttock pain, L hamstring                Rehab Codes: R26.89, M76.01  Onset date: 22                                         Next Dr's appt. : 22  Visit# / total visits:24  Cancels/No Shows: 0    Subjective:    Pain:  [x] Yes  [] No Location: L SI Pain Rating: (0-10 scale) 0/10  Pain altered Tx:  [] No  [] Yes  Action:   Comments:I'm off work this week. On a staycation but catching up around the house. Ran 2 miles then swam. Piriformis is ticked again on L due to breaststroke.  Mild great toe tingling no pain    Objective:   Manual:DI hip flexor proximal and distal, glute med, pririformis  Precautions:  Exercises:  Exercise Reps/ Time Weight/ Level Issued for HEP   Comments   Prone             Over 2 pillows with hips shifted R  x5'    x       L sidelying over 3 pillows  x3'           Press ups  10  5\"      HEP inst- overpressure   Supine             March with breathing x10         Deadbug alternating LE out straight x20  x  Hover LE   Ppt w/LE circles 2x10ea  x x cw/ccw   Eccentric bridge w/ bent knee 2x10ea   x  x     Sidelying             Clams 90/30 deg 1/2 side plank w/ hip abd 2x10   x       Quadruped        Alt LE x20  x     1/2 plank w/KB pullthrough x15 Purple KB      Bird dog  Verbal review  x     Gym             Monster walk 2 laps blue  x    Hip Hike 2x10   x    Palloff Press 2x10 15#  x    Lateral step down 2 sets 8\" x x    Resisted hip extension 2x10 lime x x    Resisted diagonal hip extension 2x10 lime x x    Resisted hip flex  2x10  lime x      Other:     Specific Instructions for next treatment: Manual prn, along with DN with NIS/NMES prn to L LE, ex progression as indicated      Treatment Charges: Mins Units   []  Modalities     [x]  Ther Exercise 35 2   [x]  Manual Therapy 15 1   []  Ther Activities     [] DN     []  Vasocompression     []  NMR     Total Treatment time 50 3       Assessment: [x] Progressing toward goals. Pt is not limited in hip extension however iliopsoas spasm is present. After DI of hip flexor and piriformis glute max activiation improved however prior to this she was testing at 3+. Worked hard on posture/positioning during exercises and holding position as pt tends to get hip on front on ankle in standing and fall into an anterior pelvic tilt during exercise which then leads to IR of the entire limb and she has difficulty getting back out of IR. Calves are very weak as well and pt had difficulty coordinating to not lead with pelvis and fall into an anterior tilt   [] No change. [] Other:  [x] Patient would continue to benefit from skilled physical therapy services in order to: Correct pelvic, thoracic and rib dysfunction as well as gastroc, soleus and glute max strength deficits to correct running mechanics and allow pt to train as she wishes    STG: (to be met in 5 treatments)  ? Pain:<3/10 L LE to allow pt to continue to train  ? ROM: Normal L great toe extension and ankle DF to allow for normal LE mechanics  ? Strength:5/5 glute max strength  ?  Function:Able to hold corrections made at pelvis and be able to hold ankle neutral during single leg exercises that don't involve impact   Independent with Home Exercise Programs     LTG: (to be met in 12 treatments)  No pain L LE to allow pt to run uninhibited   Able to perform SL squat and 20 reps of SL HR without valgus collapse at foot, ankle or knee  UWRI <5% limitation   Able to run as she wishes                    Patient goals:Decrease pain    Pt. Education:  [x] Yes  [] No  [] Reviewed Prior HEP/Ed  Method of Education: [x] Verbal  [x] Demo  [] Written  Get prone over pillows with hips shifted R to reduce nerve irritation as much as possible  Comprehension of Education:  [x] Verbalizes understanding. [] Demonstrates understanding. [] Needs review. [] Demonstrates/verbalizes HEP/Ed previously given.      Plan: [x] Continue current frequency toward long and short term goals w/ added treatment prn.    [] Specific Instructions for subsequent treatments:     Time In:  1205 Time Out: 1703    Electronically signed by:  Lor Wen, PT

## 2022-08-31 ENCOUNTER — APPOINTMENT (OUTPATIENT)
Dept: PHYSICAL THERAPY | Facility: CLINIC | Age: 42
End: 2022-08-31
Payer: COMMERCIAL

## 2022-08-31 ENCOUNTER — HOSPITAL ENCOUNTER (OUTPATIENT)
Dept: PHYSICAL THERAPY | Facility: CLINIC | Age: 42
Setting detail: THERAPIES SERIES
Discharge: HOME | End: 2022-08-31
Payer: COMMERCIAL

## 2022-08-31 PROCEDURE — 97112 NEUROMUSCULAR REEDUCATION: CPT

## 2022-08-31 PROCEDURE — 97140 MANUAL THERAPY 1/> REGIONS: CPT

## 2022-08-31 NOTE — FLOWSHEET NOTE
[] [facility]  [address]  [phone]  F: [phone] [] [address]  [facility] 36   [address]  P: [phone]  F: [phone] [x] [place] &  Therapy  [address]  P: [phone]  F: [phone] [] [address]  P: [phone]  F: [phone] [] [address]  [facility]   [address]   [place]: [phone]  F: [phone]      Physical Therapy Daily Treatment Note    Date:  2022  Patient Name:  Jenn Nguyen    :  1980  MRN: [id number]  Physician: Dr. Juno Ybarra: [facility]  Medical Diagnosis:   L buttock pain, L hamstring                Rehab Codes: R26.89, M76.01  Onset date: 22                                         Next 's appt. : 22  Visit# / total visits:25  Cancels/No Shows: 0    Subjective:    Pain:  [x] Yes  [] No Location: L SI Pain Rating: (0-10 scale) 3/10  Pain altered Tx:  [] No  [] Yes  Action:   Comments:Pt reports she has been doing pretty well except still sore from her piriformis being tight from trying to do the breast stroke yesterday. She also feels some tingling in the toe. She is off work until , but is putting on a race on  and her son has a cross country meet that day, so she wants to be able to get through those days without too many issues. Objective: L ant innominate  Manual:40 min  Resumed ART/MFR to L QL, L psoas, L piriformis, L glut med; MET for innominate along with continued CST/. MFR including pelvic diaphragm release, thoracic diaphragm release, t-inlet release, still points, added DN (5 min) to L-S Spine, L hip, L LE homeostatics and continued NMR/NMES to L5-S1, and L LE homeostatics 10 min able to hold ankle neutral during single leg exercises that don't involve impact   Independent with Home Exercise Programs     LTG: (to be met in 12 treatments)  No pain L LE to allow pt to run uninhibited   Able to perform SL squat and 20 reps of SL HR without valgus collapse at foot, ankle or knee  UWRI <5% limitation   Able to run as she wishes                    Patient goals:Decrease pain    Pt. Education:  [x] Yes  [] No  [] Reviewed Prior HEP/Ed  Method of Education: [x] Verbal  [x] Demo  [] Written  Get prone over pillows with hips shifted R to reduce nerve irritation as much as possible  Comprehension of Education:  [x] Verbalizes understanding. [] Demonstrates understanding. [] Needs review. [] Demonstrates/verbalizes HEP/Ed previously given.      Plan: [x] Continue current frequency toward long and short term goals w/ added treatment prn.    [] Specific Instructions for subsequent treatments:     Time In:  1100  Time Out: 1200    Electronically signed by:  Feliciano Parham PT

## 2022-09-06 ENCOUNTER — HOSPITAL ENCOUNTER (OUTPATIENT)
Dept: PHYSICAL THERAPY | Facility: CLINIC | Age: 42
Setting detail: THERAPIES SERIES
Discharge: HOME | End: 2022-09-06
Payer: COMMERCIAL

## 2022-09-06 PROCEDURE — 97110 THERAPEUTIC EXERCISES: CPT

## 2022-09-06 PROCEDURE — 97140 MANUAL THERAPY 1/> REGIONS: CPT

## 2022-09-06 NOTE — FLOWSHEET NOTE
[] [address]  [address]  [phone]  F: [phone] [] [address]  [facility] 36   [address]  P: [phone]  F: [phone] [x] [place] &  Therapy  [address] [facility]  P: [phone]  F: [phone] [] [address]  P: [phone]  F: [phone] [] [address]  [facility]   [address]   [place]: [phone]  F: [phone]      Physical Therapy Daily Treatment Note    Date:  2022  Patient Name:  Josias Lynch    :  1980  MRN: [id number]  Physician: Dr. Sandra Godinez: [facility]  Medical Diagnosis:   L buttock pain, L hamstring                Rehab Codes: R26.89, M76.01  Onset date: 22                                         Next 's appt. : 22  Visit# / total visits:25  Cancels/No Shows: 0    Subjective:    Pain:  [x] Yes  [] No Location: L SI Pain Rating: (0-10 scale) 0/10  Pain altered Tx:  [] No  [] Yes  Action:   Comments:I've ticked my L piriformis off again.  My entire foot is tingling    Objective:  Manual:DI hip flexor proximal and distal, glute med, piriformis, gemelli's  Precautions:  Exercises:  Exercise Reps/ Time Weight/ Level Issued for HEP   Comments   Prone             Over 2 pillows with hips shifted R  x5'    x       L sidelying over 3 pillows  x3'           Press ups  10  5\"      HEP inst- overpressure   Supine             March with breathing x10         Deadbug alternating LE out straight x20  x  Hover LE   Ppt w/LE circles 2x10ea  x  cw/ccw   Eccentric bridge w/ bent knee 2x10ea   x       Sidelying             Clams 90/30 deg             1/2 side plank w/ hip abd 2x10   x       Quadruped        Hip CARs 2x10 ea  x x    1/2 plank w/KB pullthrough x15 Purple KB      Bird dog  1 set  x x Only knee touching on bench for decreased DAHLIA   Gym             Monster walk 2 laps blue  x    Hip Hike 2x10       Palloff Press 2x10 15#  x    Lateral step down 2 sets 12\" x x    Resisted hip extension 2x10 lime x x    Resisted diagonal hip extension 2x10 lime x x    Resisted hip flex  2x10  lime x      Other:     Specific Instructions for next treatment: Manual prn, along with DN with NIS/NMES prn to L LE, ex progression as indicated      Treatment Charges: Mins Units   []  Modalities     [x]  Ther Exercise 35 2   [x]  Manual Therapy 15 1   []  Ther Activities     [] DN     []  Vasocompression     []  NMR     Total Treatment time 50 3       Assessment: [x] Progressing toward goals. Added hip CARs as pt is having a really hard time controlling hip ER. She required verbal and manual cues to keep her shin // to ground. Had a very hard time controlling pelvic positioning as well with transverse plane deviations being worst but also some anterior tilt that she was more successful at correcting. [] No change. [] Other:  [x] Patient would continue to benefit from skilled physical therapy services in order to: Correct pelvic, thoracic and rib dysfunction as well as gastroc, soleus and glute max strength deficits to correct running mechanics and allow pt to train as she wishes    STG: (to be met in 5 treatments)  ? Pain:<3/10 L LE to allow pt to continue to train  ? ROM: Normal L great toe extension and ankle DF to allow for normal LE mechanics  ? Strength:5/5 glute max strength  ?  Function:Able to hold corrections made at pelvis and be able to hold ankle neutral during single leg exercises that don't involve impact   Independent with Home Exercise Programs     LTG: (to be met in 12 treatments)  No pain L LE to allow pt to run uninhibited   Able to perform SL squat and 20 reps of SL HR without valgus collapse at foot, ankle or knee  UWRI <5% limitation   Able to run as she wishes                    Patient goals:Decrease pain    Pt. Education:  [x] Yes  [] No  [] Reviewed Prior HEP/Ed  Method of Education: [x] Verbal  [x] Demo  [] Written  Get prone over pillows with hips shifted R to reduce nerve irritation as much as possible  Comprehension of Education:  [x] Verbalizes understanding. [] Demonstrates understanding. [] Needs review. [] Demonstrates/verbalizes HEP/Ed previously given.      Plan: [x] Continue current frequency toward long and short term goals w/ added treatment prn.    [] Specific Instructions for subsequent treatments:     Time In:  1220 Time Out: 9842    Electronically signed by:  Fredy Faust PT

## 2022-09-07 ENCOUNTER — HOSPITAL ENCOUNTER (OUTPATIENT)
Dept: PHYSICAL THERAPY | Facility: CLINIC | Age: 42
Setting detail: THERAPIES SERIES
Discharge: HOME | End: 2022-09-07
Payer: COMMERCIAL

## 2022-09-07 PROCEDURE — 97140 MANUAL THERAPY 1/> REGIONS: CPT

## 2022-09-07 PROCEDURE — 97112 NEUROMUSCULAR REEDUCATION: CPT

## 2022-09-07 NOTE — FLOWSHEET NOTE
[] [facility] [facility]  [address]  [phone]  F: [phone] [] [address]  [facility]   [address]  P: [phone]  F: [phone] [x] [place] [facility]  [address] [facility] [address]  P: [phone]  F: [phone] [] [address]  P: [phone]  F: [phone] [] [address]  [facility]   [address]   [place]: [phone]  F: [phone]      Physical Therapy Daily Treatment Note    Date:  2022  Patient Name:  Luis Angel Edward    :  1980  MRN: [id number]  Physician: Dr. Winchester Nora: [facility]  Medical Diagnosis:   L buttock pain, L hamstring                Rehab Codes: R26.89, M76.01  Onset date: 22                                         Next 's appt. : 22  Visit# / total visits:27  Cancels/No Shows: 0    Subjective:    Pain:  [x] Yes  [] No Location: L SI Pain Rating: (0-10 scale) 1-2/10 \"annoyance\"  Pain altered Tx:  [] No  [] Yes  Action:   Comments:Pt reports she has been doing pretty well except still sore from her piriformis being sore from doing the dishes yesterday. She also feels some tingling in the toe. She is off work until , but is putting on a race on  and her son has a cross country meet that day, so she wants to be able to get through those days without too many issues. Objective: L ant innominate  Manual:40 min  Resumed ART/MFR to L QL, L psoas, L piriformis, L glut med; MET for innominate along with continued CST/. MFR including pelvic diaphragm release, thoracic diaphragm release, t-inlet release, still points, added DN (5 min) to L-S Spine, L hip, L LE homeostatics and continued NMR/NMES to L5-S1, and L LE homeostatics 10 min Precautions:  Exercises:  Exercise Reps/ Time Weight/ Level Issued for HEP   Comments   Prone             Over 2 pillows with hips shifted R  x5'    x       L sidelying over 3 pillows  x3'           Press ups  10  5\"    X  HEP inst- overpressure   Supine             March with breathing x10         Deadbug alternating LE out straight x20  x  Hover LE   Ppt w/LE circles 8ea  x  cw/ccw   Bent knee fall out x10ea   x       Sidelying             Clams 90/30 deg             1/2 side plank w/ hip abd 2x10   x       Quadruped        Alt LE x20  x     1/2 plank w/KB pullthrough x15 Purple KB      Bird dog  Verbal review  x     Gym             Hip Hike x10       Palloff Press 2x10 15#  x    Lateral step down 2 sets 4\" x x    Resisted hip extension 2x10 lime x x    Resisted diagonal hip extension 2x10 lime x x    Resisted hip flex  2x10  lime x  x    Other:     Specific Instructions for next treatment: Manual prn, along with DN with NIS/NMES prn to L LE, ex progression as indicated      Treatment Charges: Mins Units   []  Modalities     []  Ther Exercise     [x]  Manual Therapy 45 3   []  Ther Activities     [x] DN 5 NC   []  Vasocompression     [x]  NMR 10 1   Total Treatment time 60 4       Assessment: [x] Progressing toward goals. Pt reports improvement with treatment overall. Increased soreness this date from doing dishes yesterday, but reduced w/treatment. [] No change. [] Other:  [x] Patient would continue to benefit from skilled physical therapy services in order to: Correct pelvic, thoracic and rib dysfunction as well as gastroc, soleus and glute max strength deficits to correct running mechanics and allow pt to train as she wishes    STG: (to be met in 5 treatments)  ? Pain:<3/10 L LE to allow pt to continue to train  ? ROM: Normal L great toe extension and ankle DF to allow for normal LE mechanics  ? Strength:5/5 glute max strength  ?  Function:Able to hold corrections made at pelvis and be able to hold ankle neutral during single leg exercises that don't involve impact   Independent with Home Exercise Programs     LTG: (to be met in 12 treatments)  No pain L LE to allow pt to run uninhibited   Able to perform SL squat and 20 reps of SL HR without valgus collapse at foot, ankle or knee  UWRI <5% limitation   Able to run as she wishes                    Patient goals:Decrease pain    Pt. Education:  [x] Yes  [] No  [] Reviewed Prior HEP/Ed  Method of Education: [x] Verbal  [x] Demo  [] Written  Get prone over pillows with hips shifted R to reduce nerve irritation as much as possible  Comprehension of Education:  [x] Verbalizes understanding. [] Demonstrates understanding. [] Needs review. [] Demonstrates/verbalizes HEP/Ed previously given.      Plan: [x] Continue current frequency toward long and short term goals w/ added treatment prn.    [] Specific Instructions for subsequent treatments:     Time In:  1000 Time Out: 1100    Electronically signed by:  Rikki Bartlett PT

## 2022-09-12 ENCOUNTER — HOSPITAL ENCOUNTER (OUTPATIENT)
Dept: PHYSICAL THERAPY | Facility: CLINIC | Age: 42
Setting detail: THERAPIES SERIES
Discharge: HOME | End: 2022-09-12
Payer: COMMERCIAL

## 2022-09-12 PROCEDURE — 97140 MANUAL THERAPY 1/> REGIONS: CPT

## 2022-09-12 PROCEDURE — 97112 NEUROMUSCULAR REEDUCATION: CPT

## 2022-09-12 NOTE — FLOWSHEET NOTE
[] [facility] [address]  [address]  [phone]  F: [phone] [] [address]  [facility]   [address]  P: [phone]  F: [phone] [x] [address]  [address] [facility] [address]  P: [phone]  F: [phone] [] [address]  P: [phone]  F: [phone] [] [address]  [facility]   [address]   [place]: [phone]  F: [phone]      Physical Therapy Daily Treatment Note    Date:  2022  Patient Name:  Ro Hurt    :  1980  MRN: [id number]  Physician: Dr. Timothy Yates: [facility]  Medical Diagnosis:   L buttock pain, L hamstring                Rehab Codes: R26.89, M76.01  Onset date: 22                                         Next Dr's appt. : 22  Visit# / total visits:28  Cancels/No Shows: 0    Subjective:    Pain:  [x] Yes  [] No Location: L SI Pain Rating: (0-10 scale) 4/10   Pain altered Tx:  [] No  [] Yes  Action:   Comments:Pt reports she isn't feeling too bad in spite of putting on a race this weekend, working last night and not sleeping much. Had some tightness in the back of her L knee with standing a lot over the weekend. She states her symptoms worsened a little sooner than usual at work last night. Objective: L ant innominate  Manual:40 min  Continued ART/MFR to L QL, L psoas, L piriformis, L glut med; MET for innominate along with continued CST/. MFR including pelvic diaphragm release, thoracic diaphragm release, t-inlet release, still points, added DN (5 min) to L-S Spine, L hip, L LE homeostatics and continued NMR/NMES to L5-S1, and L LE homeostatics 10 min   Precautions:  Exercises:  Exercise Reps/ Time Weight/ Level Issued for HEP Comments   Prone             Over 2 pillows with hips shifted R  x5'    x       L sidelying over 3 pillows  x3'           Press ups  10  5\"    X  HEP inst- overpressure   Supine             March with breathing x10         Deadbug alternating LE out straight x20  x  Hover LE   Ppt w/LE circles 8ea  x  cw/ccw   Bent knee fall out x10ea   x       Sidelying             Clams 90/30 deg             1/2 side plank w/ hip abd 2x10   x       Quadruped        Alt LE x20  x     1/2 plank w/KB pullthrough x15 Purple KB      Bird dog  Verbal review  x     Gym             Hip Hike x10       Palloff Press 2x10 15#  x    Lateral step down 2 sets 4\" x x    Resisted hip extension 2x10 lime x x    Resisted diagonal hip extension 2x10 lime x x    Resisted hip flex  2x10  lime x  x    Other:     Specific Instructions for next treatment: Manual prn, along with DN with NIS/NMES prn to L LE, ex progression as indicated      Treatment Charges: Mins Units   []  Modalities     []  Ther Exercise     [x]  Manual Therapy 45 3   []  Ther Activities     [x] DN 5 NC   []  Vasocompression     [x]  NMR 10 1   Total Treatment time 60 4       Assessment: [x] Progressing toward goals. Pt reports improvement with treatment overall. Increased soreness this date from being on her feet a lot over the weekend but reduced w/treatment. [] No change. [] Other:  [x] Patient would continue to benefit from skilled physical therapy services in order to: Correct pelvic, thoracic and rib dysfunction as well as gastroc, soleus and glute max strength deficits to correct running mechanics and allow pt to train as she wishes    STG: (to be met in 5 treatments)  ? Pain:<3/10 L LE to allow pt to continue to train  ? ROM: Normal L great toe extension and ankle DF to allow for normal LE mechanics  ? Strength:5/5 glute max strength  ?  Function:Able to hold corrections made at pelvis and be able to hold ankle neutral during single leg exercises that don't involve impact   Independent with Home Exercise Programs     LTG: (to be met in 12 treatments)  No pain L LE to allow pt to run uninhibited   Able to perform SL squat and 20 reps of SL HR without valgus collapse at foot, ankle or knee  UWRI <5% limitation   Able to run as she wishes                    Patient goals:Decrease pain    Pt. Education:  [x] Yes  [] No  [] Reviewed Prior HEP/Ed  Method of Education: [x] Verbal  [x] Demo  [] Written  Get prone over pillows with hips shifted R to reduce nerve irritation as much as possible  Comprehension of Education:  [x] Verbalizes understanding. [] Demonstrates understanding. [] Needs review. [] Demonstrates/verbalizes HEP/Ed previously given.      Plan: [x] Continue current frequency toward long and short term goals w/ added treatment prn.    [] Specific Instructions for subsequent treatments:     Time In:  0800 Time Out: 0900    Electronically signed by:  Susan Krause PT

## 2022-09-13 ENCOUNTER — HOSPITAL ENCOUNTER (OUTPATIENT)
Dept: PHYSICAL THERAPY | Facility: CLINIC | Age: 42
Setting detail: THERAPIES SERIES
Discharge: HOME | End: 2022-09-13
Payer: COMMERCIAL

## 2022-09-13 PROCEDURE — 97140 MANUAL THERAPY 1/> REGIONS: CPT

## 2022-09-13 PROCEDURE — 97110 THERAPEUTIC EXERCISES: CPT

## 2022-09-13 NOTE — FLOWSHEET NOTE
[] [address]  [address]  [phone]  F: [phone] [] [address]  [place] [address]   [address]  P: [phone]  F: [phone] [x] [address]  [address]  P: [phone]  F: [phone] [] [address]  P: [phone]  F: [phone] [] [address]  [facility]   [address]   [place]: [phone]  F: [phone]      Physical Therapy Daily Treatment Note    Date:  2022  Patient Name:  Meaghan Curry    :  1980  MRN: [id number]  Physician: Dr. Lobo Nan: [facility]  Medical Diagnosis:   L buttock pain, L hamstring                Rehab Codes: R26.89, M76.01  Onset date: 22                                         Next 's appt. : 22  Visit# / total visits:28  Cancels/No Shows: 0    Subjective:    Pain:  [x] Yes  [] No Location: L SI Pain Rating: (0-10 scale) 0/10  Pain altered Tx:  [] No  [] Yes  Action:   Comments:I'm very fatigued from lack of sleep but overall muscularly feeling good. Only the big toe is tingling.  Have been getting some weird twitiching of piriformis    Objective:  Manual:DI glute med, piriformis  Precautions:  Exercises:  Exercise Reps/ Time Weight/ Level Issued for HEP   Comments   Prone             Over 2 pillows with hips shifted R  x5'    x       L sidelying over 3 pillows  x3'           Press ups  10  5\"      HEP inst- overpressure   Supine             March with breathing x10         Deadbug alternating LE out straight x20  x  Hover LE   Ppt w/LE circles 2x10ea  x  cw/ccw   Eccentric bridge w/ bent knee 2x10ea   x       Sidelying             Clams 90/30 deg             1/2 side plank w/ hip abd 2x10   x       Quadruped Hip CARs 2x10 ea  x     1/2 plank w/KB pullthrough x15 Purple KB      Bird dog  1 set  x x Only knee touching on bench for decreased DAHLIA   Gym             Monster walk 2 laps blue  x    Hip Hike 2x10       Palloff Press 2x10 20#  x    Lateral step down 2 sets 12\" x x    Dynamic lunge 2laps   x    Powerstride  2x10  18\"   x    Other:     Specific Instructions for next treatment: Manual prn, along with DN with NIS/NMES prn to L LE, ex progression as indicated      Treatment Charges: Mins Units   []  Modalities     [x]  Ther Exercise 35 2   [x]  Manual Therapy 10 1   []  Ther Activities     [] DN     []  Vasocompression     []  NMR     Total Treatment time 45 3       Assessment: [x] Progressing toward goals. Able to increase load on palloff press. Tingling into calf on first set. Second set had pt correct pelvic position and not get pelvis in front of ankles. No tingling into calf. She is also fatiguing a little quicker on the L than R still. Pt is going to try to correct positioning from pelvis to see if she can continue to limit peripheral symptoms   [] No change. [] Other:  [x] Patient would continue to benefit from skilled physical therapy services in order to: Correct pelvic, thoracic and rib dysfunction as well as gastroc, soleus and glute max strength deficits to correct running mechanics and allow pt to train as she wishes    STG: (to be met in 5 treatments)  ? Pain:<3/10 L LE to allow pt to continue to train  ? ROM: Normal L great toe extension and ankle DF to allow for normal LE mechanics  ? Strength:5/5 glute max strength  ?  Function:Able to hold corrections made at pelvis and be able to hold ankle neutral during single leg exercises that don't involve impact   Independent with Home Exercise Programs     LTG: (to be met in 12 treatments)  No pain L LE to allow pt to run uninhibited   Able to perform SL squat and 20 reps of SL HR without valgus collapse at foot, ankle or knee  UWRI <5% limitation Able to run as she wishes                    Patient goals:Decrease pain    Pt. Education:  [x] Yes  [] No  [] Reviewed Prior HEP/Ed  Method of Education: [x] Verbal  [x] Demo  [] Written  Get prone over pillows with hips shifted R to reduce nerve irritation as much as possible  Comprehension of Education:  [x] Verbalizes understanding. [] Demonstrates understanding. [] Needs review. [] Demonstrates/verbalizes HEP/Ed previously given.      Plan: [x] Continue current frequency toward long and short term goals w/ added treatment prn.    [] Specific Instructions for subsequent treatments:     Time In:  1605 Time Out: 2858    Electronically signed by:  Natali Coreas, PT

## 2022-09-20 ENCOUNTER — HOSPITAL ENCOUNTER (OUTPATIENT)
Dept: PHYSICAL THERAPY | Facility: CLINIC | Age: 42
Setting detail: THERAPIES SERIES
Discharge: HOME | End: 2022-09-20
Payer: COMMERCIAL

## 2022-09-20 PROCEDURE — 97110 THERAPEUTIC EXERCISES: CPT

## 2022-09-20 PROCEDURE — 97140 MANUAL THERAPY 1/> REGIONS: CPT

## 2022-09-20 NOTE — FLOWSHEET NOTE
[] [address]  [address]  [phone]  F: [phone] [] [address]  [facility]   [address]  P: [phone]  F: [phone] [x] [address]  [address] [facility] [address]  P: [phone]  F: [phone] [] [address]  P: [phone]  F: [phone] [] [address]  [facility]   [address]   [place]: [phone]  F: [phone]      Physical Therapy Daily Treatment Note    Date:  2022  Patient Name:  Sury Mayes    :  1980  MRN: [id number]  Physician: Dr. Toni Cleary: [facility]  Medical Diagnosis:   L buttock pain, L hamstring                Rehab Codes: R26.89, M76.01  Onset date: 22                                         Next 's appt. 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 She does state her great toe on L hurts now instead of it being N/T. SI/LB is sore after swimming today.    Objective:  Manual:DI glute med, piriformis, hip flexor B proximal and distal  Precautions:  Exercises:  Exercise Reps/ Time Weight/ Level Issued for HEP   Comments   Prone             Over 2 pillows with hips shifted R  x5'    x       L sidelying over 3 pillows  x3'           Press ups  x5  5\"    x  HEP inst- overpressure   Supine             March with breathing x10         Deadbug alternating LE out straight x20  x  Hover LE   Ppt w/LE circles 2x10ea  x  cw/ccw   Eccentric bridge w/ bent knee 2x10ea   x       Sidelying             Clams 90/30 deg             1/2 side plank w/ hip abd 2x10   x       Quadruped        Hip CARs 2x10 ea  x x    1/2 plank w/KB pullthrough x15 Purple KB      Bird dog  1 set  x x Knee flexed   Gym             Monster walk 2 laps blue  x    CC Posterior sling 2x10 15#  x    Palloff Press 2x10 20#  x    Lateral step down 2 sets 8\" x x Blue valgus correction   Retro lunge to hop x10   x    Powerstride  2x10  18\"   x    Other:     Specific Instructions for next treatment: Manual prn, along with DN with NIS/NMES prn to L LE, ex progression as indicated      Treatment Charges: Mins Units   []  Modalities     [x]  Ther Exercise 35 2   [x]  Manual Therapy 15 1   []  Ther Activities     [] DN     []  Vasocompression     []  NMR     Total Treatment time 50 3       Assessment: [x] Progressing toward goals. No increase in symptoms with exercise this date. Added valgus correction to heel tap. Pt could feel increased glute work with this and fatigued B. Decreased control in the tramsverse plane with single leg exercise. [] No change. [] Other:  [x] Patient would continue to benefit from skilled physical therapy services in order to: Correct pelvic, thoracic and rib dysfunction as well as gastroc, soleus and glute max strength deficits to correct running mechanics and allow pt to train as she wishes    STG: (to be met in 5 treatments)  ? Pain:<3/10 L LE to allow pt to continue to train  ? ROM: Normal L great toe extension and ankle DF to allow for normal LE mechanics  ? Strength:5/5 glute max strength  ?  Function:Able to hold corrections made at pelvis and be able to hold ankle neutral during single leg exercises that don't involve impact   Independent with Home Exercise Programs     LTG: (to be met in 12 treatments)  No pain L LE to allow pt to run uninhibited   Able to perform SL squat and 20 reps of SL HR without valgus collapse at foot, ankle or knee  UWRI <5% limitation   Able to run as she wishes                    Patient goals:Decrease pain    Pt. Education:  [x] Yes  [] No  [] Reviewed Prior HEP/Ed  Method of Education: [x] Verbal  [x] Demo  [] Written  Get prone over pillows with hips shifted R to reduce nerve irritation as much as possible  Comprehension of Education:  [x] Verbalizes understanding. [] Demonstrates understanding. [] Needs review. [] Demonstrates/verbalizes HEP/Ed previously given.      Plan: [x] Continue current frequency toward long and short term goals w/ added treatment prn.    [] Specific Instructions for subsequent treatments:     Time In:  1205 Time Out: 1300    Electronically signed by:  Fred Currie, PT

## 2022-09-22 ENCOUNTER — HOSPITAL ENCOUNTER (OUTPATIENT)
Dept: PHYSICAL THERAPY | Facility: CLINIC | Age: 42
Setting detail: THERAPIES SERIES
Discharge: HOME | End: 2022-09-22
Payer: COMMERCIAL

## 2022-09-22 PROCEDURE — 97140 MANUAL THERAPY 1/> REGIONS: CPT

## 2022-09-22 PROCEDURE — 97112 NEUROMUSCULAR REEDUCATION: CPT

## 2022-09-22 NOTE — FLOWSHEET NOTE
[] [address]  [address]  [phone]  F: [phone] [] [address]  [facility]   [address]  P: [phone]  F: [phone] [x] [address]  [address] [facility] [address] [facility] [address]  P: [phone]  F: [phone] [] [address]  P: [phone]  F: [phone] [] [address]  [facility]   [address]   [place]: [phone]  F: [phone]      Physical Therapy Daily Treatment Note    Date:  2022  Patient Name:  Susannah Grace    :  1980  MRN: [id number]  Physician: Dr. Pena Cable: [facility]  Medical Diagnosis:   L buttock pain, L hamstring                Rehab Codes: R26.89, M76.01  Onset date: 22                                         Next 's appt. : 22  Visit# / total visits:30  Cancels/No Shows: 0    Subjective:    Pain:  [x] Yes  [] No Location: L SI Pain Rating: (0-10 scale) 2/10   Pain altered Tx:  [] No  [x] Yes  Action: See below  Comments: Pt reports she hasn't been feeling too bad except for the day she worked (Tuesday) and the pain didn't come on until 4 am, which typically she gets pain around 1am. She reports her tingling has been intermittent which is better as well. She notes she swam yesterday and did some breast stroke. She didn't have any issues with her piriformis. States she did have big toe pain, but it turned back into tingling. Objective: Mild L post innominate  Manual:45 min  Continued ART/MFR to L QL, L psoas, L piriformis, L glut med; MET for innominate along with continued CST/. MFR including pelvic diaphragm release, thoracic diaphragm release, t-inlet release, still points, added DN (5 min) to L-S Spine, L hip, L LE homeostatics and continued NMR/NMES to L5-S1, and L LE homeostatics 10 min   Precautions:  Exercises:  Exercise Reps/ Time Weight/ Level Issued for HEP   Comments   Prone             Over 2 pillows with hips shifted R  x5'    x       L sidelying over 3 pillows  x3'           Press ups  10  5\"    X  HEP inst- overpressure   Supine             March with breathing x10         Deadbug alternating LE out straight x20  x  Hover LE   Ppt w/LE circles 8ea  x  cw/ccw   Bent knee fall out x10ea   x       Sidelying             Clams 90/30 deg             1/2 side plank w/ hip abd 2x10   x       Quadruped        Alt LE x20  x     1/2 plank w/KB pullthrough x15 Purple KB      Bird dog  Verbal review  x     Gym             Hip Hike x10       Palloff Press 2x10 15#  x    Lateral step down 2 sets 4\" x x    Resisted hip extension 2x10 lime x x    Resisted diagonal hip extension 2x10 lime x x    Resisted hip flex  2x10  lime x  x    Other:     Specific Instructions for next treatment: Manual prn, along with DN with NIS/NMES prn to L LE, ex progression as indicated      Treatment Charges: Mins Units   []  Modalities     []  Ther Exercise     [x]  Manual Therapy 45 3   []  Ther Activities     [x] DN 5 NC   []  Vasocompression     [x]  NMR 10 1   Total Treatment time 60 4       Assessment: [x] Progressing toward goals. Pt reports improvement with treatment overall with less L LE symptoms. Decreased pain and symptoms post-treatment. [] No change. [] Other:  [x] Patient would continue to benefit from skilled physical therapy services in order to: Correct pelvic, thoracic and rib dysfunction as well as gastroc, soleus and glute max strength deficits to correct running mechanics and allow pt to train as she wishes    STG: (to be met in 5 treatments)  ? Pain:<3/10 L LE to allow pt to continue to train  ? ROM: Normal L great toe extension and ankle DF to allow for normal LE mechanics  ? Strength:5/5 glute max strength  ?  Function:Able to

## 2022-09-26 ENCOUNTER — HOSPITAL ENCOUNTER (OUTPATIENT)
Dept: PHYSICAL THERAPY | Facility: CLINIC | Age: 42
Setting detail: THERAPIES SERIES
Discharge: HOME | End: 2022-09-26
Payer: COMMERCIAL

## 2022-09-26 PROCEDURE — 97140 MANUAL THERAPY 1/> REGIONS: CPT

## 2022-09-26 PROCEDURE — 97112 NEUROMUSCULAR REEDUCATION: CPT

## 2022-09-26 NOTE — FLOWSHEET NOTE
[] [facility]  [address]  [phone]  F: [phone] [] [address]  [facility]   [address]  P: [phone]  F: [phone] [x] [address]  [address]  P: [phone]  F: [phone] [] [address]  P: [phone]  F: [phone] [] [address]  [facility]   [address]   [place]: [phone]  F: [phone]      Physical Therapy Daily Treatment Note    Date:  2022  Patient Name:  Allegra Almonte    :  1980  MRN: [id number]  Physician: Dr. Danish Wills: [facility]  Medical Diagnosis:   L buttock pain, L hamstring                Rehab Codes: R26.89, M76.01  Onset date: 22                                         Next 's appt. : 22  Visit# / total visits:31  Cancels/No Shows: 0    Subjective:    Pain:  [x] Yes  [] No Location: L SI Pain Rating: (0-10 scale) 3/10   Pain altered Tx:  [] No  [x] Yes  Action: See below  Comments: Pt reports she hasn't been feeling too bad even after tripping on the side walk over the weekend and falling. Bruised both big toes, but able to move them. She reports her L toe hurts due to the fall. Back and hip are good today. Has to work today and Thursday. Objective: Mild L post innominate  Manual:45 min  Continued ART/MFR to L QL, L psoas, L piriformis, L glut med; MET for innominate along with continued CST/. MFR including pelvic diaphragm release, thoracic diaphragm release, t-inlet release, still points, added DN (5 min) to L-S Spine, L hip, L LE homeostatics and continued NMR/NMES to L5-S1, and L LE homeostatics 10 min   Precautions:  Exercises:  Exercise Reps/ Time Weight/ Level Issued for HEP   Comments Prone             Over 2 pillows with hips shifted R  x5'    x       L sidelying over 3 pillows  x3'           Press ups  10  5\"    X  HEP inst- overpressure   Supine             March with breathing x10         Deadbug alternating LE out straight x20  x  Hover LE   Ppt w/LE circles 8ea  x  cw/ccw   Bent knee fall out x10ea   x       Sidelying             Clams 90/30 deg             1/2 side plank w/ hip abd 2x10   x       Quadruped        Alt LE x20  x     1/2 plank w/KB pullthrough x15 Purple KB      Bird dog  Verbal review  x     Gym             Hip Hike x10       Palloff Press 2x10 15#  x    Lateral step down 2 sets 4\" x x    Resisted hip extension 2x10 lime x x    Resisted diagonal hip extension 2x10 lime x x    Resisted hip flex  2x10  lime x  x    Other:     Specific Instructions for next treatment: Manual prn, along with DN with NIS/NMES prn to L LE, ex progression as indicated      Treatment Charges: Mins Units   []  Modalities     []  Ther Exercise     [x]  Manual Therapy 45 3   []  Ther Activities     [x] DN 5 NC   []  Vasocompression     [x]  NMR 10 1   Total Treatment time 60 4       Assessment: [x] Progressing toward goals. Pt reports improvement with treatment overall with less L LE symptoms. Decreased pain and symptoms post-treatment. [] No change. [] Other:  [x] Patient would continue to benefit from skilled physical therapy services in order to: Correct pelvic, thoracic and rib dysfunction as well as gastroc, soleus and glute max strength deficits to correct running mechanics and allow pt to train as she wishes    STG: (to be met in 5 treatments)  ? Pain:<3/10 L LE to allow pt to continue to train  ? ROM: Normal L great toe extension and ankle DF to allow for normal LE mechanics  ? Strength:5/5 glute max strength  ?  Function:Able to hold corrections made at pelvis and be able to hold ankle neutral during single leg exercises that don't involve impact   Independent with Home Exercise Programs     LTG: (to be met in 12 treatments)  No pain L LE to allow pt to run uninhibited   Able to perform SL squat and 20 reps of SL HR without valgus collapse at foot, ankle or knee  UWRI <5% limitation   Able to run as she wishes                    Patient goals:Decrease pain    Pt. Education:  [x] Yes  [] No  [] Reviewed Prior HEP/Ed  Method of Education: [x] Verbal  [x] Demo  [] Written  Get prone over pillows with hips shifted R to reduce nerve irritation as much as possible  Comprehension of Education:  [x] Verbalizes understanding. [] Demonstrates understanding. [] Needs review. [] Demonstrates/verbalizes HEP/Ed previously given.      Plan: [x] Continue current frequency toward long and short term goals w/ added treatment prn.    [] Specific Instructions for subsequent treatments:     Time In:  0800 Time Out: 0900    Electronically signed by:  Misty Barrera PT

## 2022-09-29 ENCOUNTER — HOSPITAL ENCOUNTER (OUTPATIENT)
Dept: PHYSICAL THERAPY | Facility: CLINIC | Age: 42
Setting detail: THERAPIES SERIES
Discharge: HOME | End: 2022-09-29
Payer: COMMERCIAL

## 2022-09-29 PROCEDURE — 97140 MANUAL THERAPY 1/> REGIONS: CPT

## 2022-09-29 PROCEDURE — 97110 THERAPEUTIC EXERCISES: CPT

## 2022-09-29 NOTE — FLOWSHEET NOTE
[] [facility] [address]  [address]  [phone]  F: [phone] [] [address]  [facility] [place] [facility]   [address]  P: [phone]  F: [phone] [x] [address]  [address]  P: [phone]  F: [phone] [] [address]  P: [phone]  F: [phone] [] [address]  [facility]   [address]   [place]: [phone]  F: [phone]      Physical Therapy Daily Treatment Note    Date:  2022  Patient Name:  Gabi Rich    :  1980  MRN: [id number]  Physician: Dr. Jo-Ann Hackett: [facility]  Medical Diagnosis:   L buttock pain, L hamstring                Rehab Codes: R26.89, M76.01  Onset date: 22                                         Next 's appt. : 22  Visit# / total visits:32  Cancels/No Shows: 0    Subjective:    Pain:  [x] Yes  [] No Location: L SI Pain Rating: (0-10 scale) 3/10   Pain altered Tx:  [] No  [x] Yes  Action: See below  Comments: Pt reports she is doing OK . L great toe still painful from fall and have been trying to stay off of it. L QL is back to being tight.  Feel like it is from compensating for toe    Objective:   Manual: MET to correct innominate, DI piriformis, QFand glut med, Hypervolt B middle trap, lat, lower trap, Cross hand in R sidelying to obliques   Precautions:  Exercises:  Exercise Reps/ Time Weight/ Level Issued for HEP   Comments   Prone             Over 2 pillows with hips shifted R  x5'    x       L sidelying over 3 pillows  x3'           Press ups  10  5\"      HEP inst- overpressure    with breathing x10         Deadbug alternating LE out straight x20  x  Hover LE   Ppt w/LE circles 8ea  x  cw/ccw   Bent knee fall out x10ea   x       Sidelying             Clams 90/30 deg             1/2 side plank w/ hip abd 2x10   x       Quadruped        Alt LE x20  x     1/2 plank w/KB pullthrough x15 Purple KB      Bird dog  Verbal review  x     Gym             Chair position row 2 sets Fuentes x x    Palloff Press 2x10 15#      Lateral step down 2 sets 8\" x x    Banded toe tap hip extension 2sets blue x x    Banded toe tap diagonal hip extension 2 sets blue x x    Resisted hip flex  2x10  lime x      Other:     Specific Instructions for next treatment: Manual prn, along with DN with NIS/NMES prn to L LE, ex progression as indicated      Treatment Charges: Mins Units   []  Modalities     [x]  Ther Exercise 25 2   [x]  Manual Therapy 20 1   []  Ther Activities     [] DN     []  Vasocompression     []  NMR     Total Treatment time 45 3       Assessment: [x] Progressing toward goals. Pt presents to PT today with mild offloading with gait on L due to toe injury from fall. This leads to more frontal plane deviation and can explain the flare at QL that pt is describing. Kept exercises limited to those that would not extend great toe. Pt did well with these and no increase in great toe pain reported. Pt did the best she has done at controlling dynamic genu valgus. Worked hard on not dumping into an anterior pelvic tilt. Neutral spine is much more difficult to achieve and hold in single leg on L but pt was able to repeat after manual and verbal cue with good success. [] No change. [] Other:  [x] Patient would continue to benefit from skilled physical therapy services in order to: Correct pelvic, thoracic and rib dysfunction as well as gastroc, soleus and glute max strength deficits to correct running mechanics and allow pt to train as she wishes    STG: (to be met in 5 treatments)  ? Pain:<3/10 L LE to allow pt to continue to train  ? ROM: Normal L great toe extension and ankle DF to allow for normal LE mechanics  ? Strength:5/5 glute max strength  ? Function:Able to hold corrections made at pelvis and be able to hold ankle neutral during single leg exercises that don't involve impact   Independent with Home Exercise Programs     LTG: (to be met in 12 treatments)  No pain L LE to allow pt to run uninhibited   Able to perform SL squat and 20 reps of SL HR without valgus collapse at foot, ankle or knee  UWRI <5% limitation   Able to run as she wishes                    Patient goals:Decrease pain    Pt. Education:  [x] Yes  [] No  [] Reviewed Prior HEP/Ed  Method of Education: [x] Verbal  [x] Demo  [] Written  Get prone over pillows with hips shifted R to reduce nerve irritation as much as possible  Comprehension of Education:  [x] Verbalizes understanding. [] Demonstrates understanding. [] Needs review. [] Demonstrates/verbalizes HEP/Ed previously given.      Plan: [x] Continue current frequency toward long and short term goals w/ added treatment prn.    [] Specific Instructions for subsequent treatments:     Time In:  1200 Time Out: 3729    Electronically signed by:  Alva Romero, PT

## 2022-10-03 ENCOUNTER — HOSPITAL ENCOUNTER (OUTPATIENT)
Dept: PHYSICAL THERAPY | Facility: CLINIC | Age: 42
Setting detail: THERAPIES SERIES
Discharge: HOME | End: 2022-10-03
Payer: COMMERCIAL

## 2022-10-03 PROCEDURE — 97112 NEUROMUSCULAR REEDUCATION: CPT

## 2022-10-03 PROCEDURE — 97140 MANUAL THERAPY 1/> REGIONS: CPT

## 2022-10-03 NOTE — FLOWSHEET NOTE
[] [facility] [address]  [address]  [phone]  F: [phone] [] [address]  [facility]   [address]  P: [phone]  F: [phone] [x] [place]  [address]  P: [phone]  F: [phone] [] [address]  P: [phone]  F: [phone] [] [address]  [facility]   [address]   [place]: [phone]  F: [phone]      Physical Therapy Daily Treatment Note    Date:  10/3/2022  Patient Name:  Aydee Soares    :  1980  MRN: [id number]  Physician: Dr. Yovana Bean: [facility]  Medical Diagnosis:   L buttock pain, L hamstring                Rehab Codes: R26.89, M76.01  Onset date: 22                                         Next 's appt. : 22  Visit# / total visits:33  Cancels/No Shows: 0    Subjective:    Pain:  [x] Yes  [] No Location: L SI Pain Rating: (0-10 scale) 4/10 (toe) everything else is intermittent   Pain altered Tx:  [] No  [x] Yes  Action: See below  Comments: Pt reports she is still having pain in her L big toe. Some discomfort in her S-I joint and the R QL. Feels as if she is compensating. States walking hurts more than running with her toe pain. Has to work today and Saturday. Objective: Mod L ant innominate  Manual:45 min  Continued ART/MFR to L QL, L psoas, L piriformis, L glut med; MET for innominate along with continued CST/. MFR including pelvic diaphragm release, thoracic diaphragm release, t-inlet release, OCB release, frontal, parietal, temporal, and mandibular releases added this date; added DN (5 min) to L-S Spine, L hip, L LE homeostatics and continued NMR/NMES to L5-S1, and L LE homeostatics 10 min Precautions:  Exercises:  Exercise Reps/ Time Weight/ Level Issued for HEP   Comments   Prone             Over 2 pillows with hips shifted R  x5'    x       L sidelying over 3 pillows  x3'           Press ups  10  5\"    X  HEP inst- overpressure   Supine             March with breathing x10         Deadbug alternating LE out straight x20  x  Hover LE   Ppt w/LE circles 8ea  x  cw/ccw   Bent knee fall out x10ea   x       Sidelying             Clams 90/30 deg             1/2 side plank w/ hip abd 2x10   x       Quadruped        Alt LE x20  x     1/2 plank w/KB pullthrough x15 Purple KB      Bird dog  Verbal review  x     Gym             Hip Hike x10       Palloff Press 2x10 15#  x    Lateral step down 2 sets 4\" x x    Resisted hip extension 2x10 lime x x    Resisted diagonal hip extension 2x10 lime x x    Resisted hip flex  2x10  lime x  x    Other:     Specific Instructions for next treatment: Manual prn, along with DN with NIS/NMES prn to L LE, ex progression as indicated      Treatment Charges: Mins Units   []  Modalities     []  Ther Exercise     [x]  Manual Therapy 45 3   []  Ther Activities     [x] DN 5 NC   []  Vasocompression     [x]  NMR 10 1   Total Treatment time 60 4       Assessment: [x] Progressing toward goals. = pelvis post-treatment. Pt reports improvement with treatment overall with less L LE symptoms. Decreased pain and symptoms post-treatment. [] No change. [] Other:  [x] Patient would continue to benefit from skilled physical therapy services in order to: Correct pelvic, thoracic and rib dysfunction as well as gastroc, soleus and glute max strength deficits to correct running mechanics and allow pt to train as she wishes    STG: (to be met in 5 treatments)  ? Pain:<3/10 L LE to allow pt to continue to train  ? ROM: Normal L great toe extension and ankle DF to allow for normal LE mechanics  ? Strength:5/5 glute max strength  ?  Function:Able to hold corrections made at pelvis and be able to hold ankle neutral during single leg exercises that don't involve impact   Independent with Home Exercise Programs     LTG: (to be met in 12 treatments)  No pain L LE to allow pt to run uninhibited   Able to perform SL squat and 20 reps of SL HR without valgus collapse at foot, ankle or knee  UWRI <5% limitation   Able to run as she wishes                    Patient goals:Decrease pain    Pt. Education:  [x] Yes  [] No  [] Reviewed Prior HEP/Ed  Method of Education: [x] Verbal  [x] Demo  [] Written  Get prone over pillows with hips shifted R to reduce nerve irritation as much as possible  Comprehension of Education:  [x] Verbalizes understanding. [] Demonstrates understanding. [] Needs review. [] Demonstrates/verbalizes HEP/Ed previously given.      Plan: [x] Continue current frequency toward long and short term goals w/ added treatment prn.    [] Specific Instructions for subsequent treatments:     Time In:  0700 Time Out: 0800    Electronically signed by:  Sarai Young, PT

## 2022-10-06 ENCOUNTER — HOSPITAL ENCOUNTER (OUTPATIENT)
Dept: PHYSICAL THERAPY | Facility: CLINIC | Age: 42
Setting detail: THERAPIES SERIES
Discharge: HOME | End: 2022-10-06
Payer: COMMERCIAL

## 2022-10-06 PROCEDURE — 97110 THERAPEUTIC EXERCISES: CPT

## 2022-10-06 PROCEDURE — 97140 MANUAL THERAPY 1/> REGIONS: CPT

## 2022-10-06 NOTE — FLOWSHEET NOTE
[] [address]  [address]  [phone]  F: [phone] [] [address]  [facility]   [address]  P: [phone]  F: [phone] [x] [address]  [address]  P: [phone]  F: [phone] [] [address]  P: [phone]  F: [phone] [] [address]  [facility]   [address]   [place]: [phone]  F: [phone]      Physical Therapy Daily Treatment Note    Date:  10/6/2022  Patient Name:  Navin Canela    :  1980  MRN: [id number]  Physician: Dr. Evaristo Kruse: [facility]  Medical Diagnosis:   L buttock pain, L hamstring                Rehab Codes: R26.89, M76.01  Onset date: 22                                         Next 's appt. : 22  Visit# / total visits:34  Cancels/No Shows: 0    Subjective:    Pain:  [x] Yes  [] No Location: L SI Pain Rating: (0-10 scale) 2/10   Pain altered Tx:  [] No  [x] Yes  Action: See below  Comments: Pt reports her big toe hurts more with walking than running. Reports she ran 3 miles and swam today.  Just feeling some QL tightness    Objective:   Manual: DI hip flexor proximal and distal, QFand glut med,  Cross hand in R sidelying to obliques   Precautions:  Exercises:  Exercise Reps/ Time Weight/ Level Issued for HEP   Comments   Prone             Over 2 pillows with hips shifted R  x5'    x       L sidelying over 3 pillows  x3'           Press ups  10  5\"      HEP inst- overpressure   Supine             Foam roller lat to pect stretch x15   x    Foam roller upper back x20   x    March with breathing x10         Deadbug alternating LE out straight x20  x  Hover LE   Ppt w/LE circles 8ea  x  cw/ccw   Bent knee fall out x10ea   x       Sidelying             Clams 90/30 deg             1/2 side plank w/ hip abd 2x10   x       Quadruped        Hip CARs x20  x x    1/2 plank w/KB pullthrough x15 Purple KB      Bird dog  2 sets  x x    Gym             Chair position row 2 sets Red heavy band  x x    Palloff Press 2x10 15#      Posterior sling on CC  15#  x    Lateral step down 2 sets 8\" x     Banded toe tap hip extension 2sets blue x x    Banded toe tap diagonal hip extension 2 sets blue x x    Resisted hip flex  2x10  lime x      Other:     Specific Instructions for next treatment: Manual prn, along with DN with NIS/NMES prn to L LE, ex progression as indicated      Treatment Charges: Mins Units   []  Modalities     [x]  Ther Exercise 35 2   [x]  Manual Therapy 15 1   []  Ther Activities     [] DN     []  Vasocompression     []  NMR     Total Treatment time 50 3       Assessment: [x] Progressing toward goals. Pt was tested for rotational mobility at tspine and pelvis. She is lacking R rotation at tspine. After foam roller pt was equal and this produced an equal result at pelvis as well. Pt continues to have difficulty holding neutral spine in quadruped as well as with resisted scapular stability exercises, but does well in single leg standing. QL pain and tightness reported as resolved by end of treatment. [] No change. [] Other:  [x] Patient would continue to benefit from skilled physical therapy services in order to: Correct pelvic, thoracic and rib dysfunction as well as gastroc, soleus and glute max strength deficits to correct running mechanics and allow pt to train as she wishes    STG: (to be met in 5 treatments)  ? Pain:<3/10 L LE to allow pt to continue to train  ? ROM: Normal L great toe extension and ankle DF to allow for normal LE mechanics  ? Strength:5/5 glute max strength  ?  Function:Able to hold corrections made at pelvis and be able to hold ankle neutral during single leg exercises that don't involve impact   Independent with Home Exercise Programs     LTG: (to be met in 12 treatments)  No pain L LE to allow pt to run uninhibited   Able to perform SL squat and 20 reps of SL HR without valgus collapse at foot, ankle or knee  UWRI <5% limitation   Able to run as she wishes                    Patient goals:Decrease pain    Pt. Education:  [x] Yes  [] No  [] Reviewed Prior HEP/Ed  Method of Education: [x] Verbal  [x] Demo  [] Written  Get prone over pillows with hips shifted R to reduce nerve irritation as much as possible  Comprehension of Education:  [x] Verbalizes understanding. [] Demonstrates understanding. [] Needs review. [] Demonstrates/verbalizes HEP/Ed previously given.      Plan: [x] Continue current frequency toward long and short term goals w/ added treatment prn.    [] Specific Instructions for subsequent treatments:     Time In:  1200 Time Out: 1250    Electronically signed by:  Sangita Marin, PT

## 2022-10-18 ENCOUNTER — HOSPITAL ENCOUNTER (OUTPATIENT)
Dept: PHYSICAL THERAPY | Facility: CLINIC | Age: 42
Setting detail: THERAPIES SERIES
Discharge: HOME | End: 2022-10-18
Payer: COMMERCIAL

## 2022-10-18 PROCEDURE — 97140 MANUAL THERAPY 1/> REGIONS: CPT

## 2022-10-18 PROCEDURE — 97112 NEUROMUSCULAR REEDUCATION: CPT

## 2022-10-18 NOTE — FLOWSHEET NOTE
[] [address]  [address]  [phone]  F: [phone] [] [address]  [facility]   [address]  P: [phone]  F: [phone] [x] [address]  [address] [facility]  P: [phone]  F: [phone] [] [address]  P: [phone]  F: [phone] [] [address]  [facility]   [address]   [place]: [phone]  F: [phone]      Physical Therapy Daily Treatment Note    Date:  10/18/2022  Patient Name:  Vicente Palacios    :  1980  MRN: [id number]  Physician: Dr. Kevin Savage: [facility]  Medical Diagnosis:   L buttock pain, L hamstring                Rehab Codes: R26.89, M76.01  Onset date: 22                                         Next 's appt. : 22  Visit# / total visits:35  Cancels/No Shows: 0    Subjective:    Pain:  [x] Yes  [] No Location: L SI Pain Rating: (0-10 scale) 1-2/10 LB, Hip, toe all intermittent  Pain altered Tx:  [] No  [x] Yes  Action: See below  Comments: Pt reports she is having a little pain and stiffness in her L hip, LB and occasionally in her toe after travelling to [place] and back over the weekend. Toe pain has calmed down overall. Objective: Mod L ant innominate  Manual:45 min  Continued ART/MFR to L QL, L psoas, L piriformis, L glut med; MET for innominate along with continued CST/. MFR including pelvic diaphragm release, thoracic diaphragm release, t-inlet release, OCB release, frontal, parietal, temporal, and mandibular releases added this date; added DN (5 min) to L-S Spine, L hip, L LE homeostatics and continued NMR/NMES to L5-S1, and L LE homeostatics 10 min   Precautions:  Exercises:  Exercise Reps/ Time Weight/ Level Issued for HEP   Comments   Prone             Over 2 pillows with hips shifted R  x5'    x       L sidelying over 3 pillows  x3'          Press ups  10  5\"    X  HEP inst- overpressure   Supine             March with breathing x10         Deadbug alternating LE out straight x20  x  Hover LE   Ppt w/LE circles 8ea  x  cw/ccw   Bent knee fall out x10ea   x       Sidelying             Clams 90/30 deg             1/2 side plank w/ hip abd 2x10   x       Quadruped        Alt LE x20  x     1/2 plank w/KB pullthrough x15 Purple KB      Bird dog  Verbal review  x     Gym             Hip Hike x10       Palloff Press 2x10 15#  x    Lateral step down 2 sets 4\" x x    Resisted hip extension 2x10 lime x x    Resisted diagonal hip extension 2x10 lime x x    Resisted hip flex  2x10  lime x  x    Other:     Specific Instructions for next treatment: Manual prn, along with DN with NIS/NMES prn to L LE, ex progression as indicated      Treatment Charges: Mins Units   []  Modalities     []  Ther Exercise     [x]  Manual Therapy 45 3   []  Ther Activities     [x] DN 5 NC   []  Vasocompression     [x]  NMR 10 1   Total Treatment time 60 4       Assessment: [x] Progressing toward goals. = pelvis post-treatment. Pt reports improvement with treatment overall with less L LE symptoms. Decreased pain and symptoms post-treatment. [] No change. [] Other:  [x] Patient would continue to benefit from skilled physical therapy services in order to: Correct pelvic, thoracic and rib dysfunction as well as gastroc, soleus and glute max strength deficits to correct running mechanics and allow pt to train as she wishes    STG: (to be met in 5 treatments)  ? Pain:<3/10 L LE to allow pt to continue to train  ? ROM: Normal L great toe extension and ankle DF to allow for normal LE mechanics  ? Strength:5/5 glute max strength  ?  Function:Able to hold corrections made at pelvis and be able to hold ankle neutral during single leg exercises that don't involve impact   Independent with Home Exercise Programs     LTG: (to be met in 12 treatments)  No pain L LE to allow pt to run uninhibited   Able to perform SL squat and 20 reps of SL HR without valgus collapse at foot, ankle or knee  UWRI <5% limitation   Able to run as she wishes                    Patient goals:Decrease pain    Pt. Education:  [x] Yes  [] No  [] Reviewed Prior HEP/Ed  Method of Education: [x] Verbal  [x] Demo  [] Written  Get prone over pillows with hips shifted R to reduce nerve irritation as much as possible  Comprehension of Education:  [x] Verbalizes understanding. [] Demonstrates understanding. [] Needs review. [] Demonstrates/verbalizes HEP/Ed previously given.      Plan: [x] Continue current frequency toward long and short term goals w/ added treatment prn.    [] Specific Instructions for subsequent treatments:     Time In:  1330 Time Out: 1430    Electronically signed by:  Terry Lazcano, PT

## 2022-10-20 ENCOUNTER — HOSPITAL ENCOUNTER (OUTPATIENT)
Dept: PHYSICAL THERAPY | Facility: CLINIC | Age: 42
Setting detail: THERAPIES SERIES
Discharge: HOME | End: 2022-10-20
Payer: COMMERCIAL

## 2022-10-20 PROCEDURE — 97110 THERAPEUTIC EXERCISES: CPT

## 2022-10-20 PROCEDURE — 97140 MANUAL THERAPY 1/> REGIONS: CPT

## 2022-10-20 NOTE — FLOWSHEET NOTE
[] [facility] [address]  [address]  [phone]  F: [phone] [] [address]  [facility]   [address]  P: [phone]  F: [phone] [x] [address]  [address]  P: [phone]  F: [phone] [] [address]  P: [phone]  F: [phone] [] [address]  [facility]   [address]   [place]: [phone]  F: [phone]      Physical Therapy Daily Treatment Note    Date:  10/20/2022  Patient Name:  Elisabeth Ho    :  1980  MRN: [id number]  Physician: Dr. Kathy Child: [facility]  Medical Diagnosis:   L buttock pain, L hamstring                Rehab Codes: R26.89, M76.01  Onset date: 22                                         Next 's appt. : 22  Visit# / total visits:37  Cancels/No Shows: 0    Subjective:    Pain:  [x] Yes  [] No Location: L SI Pain Rating: (0-10 scale) 2/10   Pain altered Tx:  [] No  [x] Yes  Action: See below  Comments: Pt reports she is doing pretty good. Toe still hurts a bit but dong better.  Denies N/T and states just some L QL tightness    Objective:   Manual: DI hip flexor proximal and distal,  Cross hand in R sidelying to obliques   Precautions:  Exercises:  Exercise Reps/ Time Weight/ Level Issued for HEP   Comments   Prone             Over 2 pillows with hips shifted R  x5'    x       L sidelying over 3 pillows  x3'           Press ups  10  5\"      HEP inst- overpressure   Supine             Foam roller lat to pect stretch x15   x    Foam roller upper back x20   x    March with breathing x10         Deadbug alternating LE out straight x20  x  Hover LE   Ppt w/LE circles 8ea  x  cw/ccw   Bent knee fall out x10ea   x       Sidelying Clams 90/30 deg             1/2 side plank w/ hip abd 2x10   x       Quadruped        Hip CARs x20  x     1/2 plank w/KB pullthrough x15 Purple KB      Bird dog  2 sets  x     Gym             Chair squat row 2 sets Red heavy band  x x    Palloff Press 2x10 15#  x    Posterior sling on CC 2x15 15#  x    Lateral step down 2 sets 8\" x x    Banded toe tap hip extension 2sets purple x x    Banded toe tap diagonal hip extension 2 sets purple x x    Powerstride  2x10  18' x  x    Other:     Specific Instructions for next treatment: Manual prn, along with DN with NIS/NMES prn to L LE, ex progression as indicated      Treatment Charges: Mins Units   []  Modalities     [x]  Ther Exercise 35 2   [x]  Manual Therapy 15 1   []  Ther Activities     [] DN     []  Vasocompression     []  NMR     Total Treatment time 50 3       Assessment: [x] Progressing toward goals. Pt deviates in the transverse plane when performing single leg exercises. Able to correct and be stable on R but L is difficult and still fatigues more quickly on L.               [] No change. [] Other:  [x] Patient would continue to benefit from skilled physical therapy services in order to: Correct pelvic, thoracic and rib dysfunction as well as gastroc, soleus and glute max strength deficits to correct running mechanics and allow pt to train as she wishes    STG: (to be met in 5 treatments)  ? Pain:<3/10 L LE to allow pt to continue to train  ? ROM: Normal L great toe extension and ankle DF to allow for normal LE mechanics  ? Strength:5/5 glute max strength  ?  Function:Able to hold corrections made at pelvis and be able to hold ankle neutral during single leg exercises that don't involve impact   Independent with Home Exercise Programs     LTG: (to be met in 12 treatments)  No pain L LE to allow pt to run uninhibited   Able to perform SL squat and 20 reps of SL HR without valgus collapse at foot, ankle or knee  UWRI <5% limitation   Able to run as she wishes                    Patient goals:Decrease pain    Pt. Education:  [x] Yes  [] No  [] Reviewed Prior HEP/Ed  Method of Education: [x] Verbal  [x] Demo  [] Written  Get prone over pillows with hips shifted R to reduce nerve irritation as much as possible  Comprehension of Education:  [x] Verbalizes understanding. [] Demonstrates understanding. [] Needs review. [] Demonstrates/verbalizes HEP/Ed previously given.      Plan: [x] Continue current frequency toward long and short term goals w/ added treatment prn.    [] Specific Instructions for subsequent treatments:     Time In:  1200 Time Out: 1250    Electronically signed by:  Kashif Abbott, PT

## 2022-10-24 ENCOUNTER — HOSPITAL ENCOUNTER (OUTPATIENT)
Dept: PHYSICAL THERAPY | Facility: CLINIC | Age: 42
Setting detail: THERAPIES SERIES
Discharge: HOME | End: 2022-10-24
Payer: COMMERCIAL

## 2022-10-24 PROCEDURE — 97112 NEUROMUSCULAR REEDUCATION: CPT

## 2022-10-24 PROCEDURE — 97140 MANUAL THERAPY 1/> REGIONS: CPT

## 2022-10-24 NOTE — FLOWSHEET NOTE
[] [facility] [address]  [address]  [phone]  F: [phone] [] [address]  [place] [facility]   [address]  P: [phone]  F: [phone] [x] [address]  [address]  P: [phone]  F: [phone] [] [address]  P: [phone]  F: [phone] [] [address]  [facility]   [address]   [place]: [phone]  F: [phone]      Physical Therapy Daily Treatment Note    Date:  10/24/2022  Patient Name:  Colton Amin    :  1980  MRN: [id number]  Physician: Dr. Gretta Blizzard: [facility]  Medical Diagnosis:   L buttock pain, L hamstring                Rehab Codes: R26.89, M76.01  Onset date: 22                                         Next Dr's appt. : 22  Visit# / total visits:37  Cancels/No Shows: 0    Subjective:    Pain:  [x] Yes  [] No Location: L SI Pain Rating: (0-10 scale) 2/10 LB, Hip, toe all intermittent  Pain altered Tx:  [] No  [x] Yes  Action: See below  Comments: Pt reports she ran 10 miles over the weekend and didn't have too many issues, mostly just upper back soreness. Toe pain has calmed down overall. Objective: Mild L ant innominate  Manual:45 min  Continued ART/MFR to L QL, L psoas, L piriformis, L glut med; MET for innominate along with continued CST/. MFR including pelvic diaphragm release, thoracic diaphragm release, t-inlet release, OCB release, frontal, parietal, temporal, and mandibular releases added this date; added DN (5 min) to L-S Spine, L hip, L LE homeostatics and continued NMR/NMES to L5-S1, and L LE homeostatics 10 min   Precautions:  Exercises:  Exercise Reps/ Time Weight/ Level Issued for HEP   Comments   Prone Over 2 pillows with hips shifted R  x5'    x       L sidelying over 3 pillows  x3'          Press ups  10  5\"    X  HEP inst- overpressure   Supine             March with breathing x10         Deadbug alternating LE out straight x20  x  Hover LE   Ppt w/LE circles 8ea  x  cw/ccw   Bent knee fall out x10ea   x       Sidelying             Clams 90/30 deg             1/2 side plank w/ hip abd 2x10   x       Quadruped        Alt LE x20  x     1/2 plank w/KB pullthrough x15 Purple KB      Bird dog  Verbal review  x     Gym             Hip Hike x10       Palloff Press 2x10 15#  x    Lateral step down 2 sets 4\" x x    Resisted hip extension 2x10 lime x x    Resisted diagonal hip extension 2x10 lime x x    Resisted hip flex  2x10  lime x  x    Other:     Specific Instructions for next treatment: Manual prn, along with DN with NIS/NMES prn to L LE, ex progression as indicated      Treatment Charges: Mins Units   []  Modalities     []  Ther Exercise     [x]  Manual Therapy 45 3   []  Ther Activities     [x] DN 5 NC   []  Vasocompression     [x]  NMR 10 1   Total Treatment time 60 4       Assessment: [x] Progressing toward goals. = pelvis post-treatment. Pt reports improvement with treatment overall with less L LE symptoms and improvement in being able to run 10 miles with intervals. Decreased pain and symptoms post-treatment. [] No change. [] Other:  [x] Patient would continue to benefit from skilled physical therapy services in order to: Correct pelvic, thoracic and rib dysfunction as well as gastroc, soleus and glute max strength deficits to correct running mechanics and allow pt to train as she wishes    STG: (to be met in 5 treatments)  ? Pain:<3/10 L LE to allow pt to continue to train  ? ROM: Normal L great toe extension and ankle DF to allow for normal LE mechanics  ? Strength:5/5 glute max strength  ?  Function:Able to hold corrections made at pelvis and be able to hold ankle neutral during single leg exercises that don't involve impact   Independent with Home Exercise Programs     LTG: (to be met in 12 treatments)  No pain L LE to allow pt to run uninhibited   Able to perform SL squat and 20 reps of SL HR without valgus collapse at foot, ankle or knee  UWRI <5% limitation   Able to run as she wishes                    Patient goals:Decrease pain    Pt. Education:  [x] Yes  [] No  [] Reviewed Prior HEP/Ed  Method of Education: [x] Verbal  [x] Demo  [] Written  Get prone over pillows with hips shifted R to reduce nerve irritation as much as possible  Comprehension of Education:  [x] Verbalizes understanding. [] Demonstrates understanding. [] Needs review. [] Demonstrates/verbalizes HEP/Ed previously given.      Plan: [x] Continue current frequency toward long and short term goals w/ added treatment prn.    [] Specific Instructions for subsequent treatments:     Time In:  1030 Time Out: 1130    Electronically signed by:  Aureliano Perez PT

## 2022-11-01 ENCOUNTER — HOSPITAL ENCOUNTER (OUTPATIENT)
Dept: PHYSICAL THERAPY | Facility: CLINIC | Age: 42
Setting detail: THERAPIES SERIES
Discharge: HOME | End: 2022-11-01
Payer: COMMERCIAL

## 2022-11-01 PROCEDURE — 97110 THERAPEUTIC EXERCISES: CPT

## 2022-11-01 PROCEDURE — 97140 MANUAL THERAPY 1/> REGIONS: CPT

## 2022-11-01 NOTE — FLOWSHEET NOTE
[] [address]  [address]  [phone]  F: [phone] [] [address]  [facility]   [address]  P: [phone]  F: [phone] [x] [address]  [address]  P: [phone]  F: [phone] [] [address]  P: [phone]  F: [phone] [] [address]  [facility]   [address]   [place]: [phone]  F: [phone]      Physical Therapy Daily Treatment Note    Date:  2022  Patient Name:  Navin Canela    :  1980  MRN: [id number]  Physician: Dr. Evaristo Kruse: [facility]  Medical Diagnosis:   L buttock pain, L hamstring                Rehab Codes: R26.89, M76.01  Onset date: 22                                         Next Dr's appt. : 22  Visit# / total visits:38  Cancels/No Shows: 0    Subjective:    Pain:  [x] Yes  [] No Location: L SI Pain Rating: (0-10 scale) 2/10   Pain altered Tx:  [] No  [x] Yes  Action: See below  Comments: Pt reports she made it through 70hrs of work in 4 days. Don't have to work again until A5.     Objective:   Manual: DI hip flexor proximal and distal,  glute med,  TFL, Cross hand in R sidelying to obliques   Precautions:  Exercises:  Exercise Reps/ Time Weight/ Level Issued for HEP   Comments   Prone             Over 2 pillows with hips shifted R  x5'    x       L sidelying over 3 pillows  x3'           Press ups  10  5\"      HEP inst- overpressure   Supine             Foam roller lat to pect stretch x15   x    Foam roller upper back x20   x    March with breathing x10         Deadbug alternating LE out straight x20  x  Hover LE   Ppt w/LE circles 8ea  x  cw/ccw   Bent knee fall out x10ea   x       Sidelying Clams 90/30 deg             1/2 side plank w/ hip abd 2x10   x       Quadruped        Hip CARs x20  x     1/2 plank w/KB pullthrough x15 Purple KB      Bird dog  2 sets  x     Gym             Chair squat row 1 sets Red heavy band  x x    Palloff Press 2x10 15#      Posterior sling on CC 2x15 15#  x    Lateral step down 1 sets 8\" x x    Banded toe tap hip extension 1sets purple x x    Banded toe tap diagonal hip extension 1 sets purple x x    Powerstride  x10  18' x  x    Other:     Specific Instructions for next treatment: Manual prn, along with DN with NIS/NMES prn to L LE, ex progression as indicated      Treatment Charges: Mins Units   []  Modalities     [x]  Ther Exercise 30 2   [x]  Manual Therapy 15 1   []  Ther Activities     [] DN     []  Vasocompression     []  NMR     Total Treatment time 45 3       Assessment: [x] Progressing toward goals. Pt deviates in the transverse plane when performing single leg exercises. Able to correct and be stable on R but L is difficult and still fatigues more quickly on L. Less contralateral hip drop with powerstride this date. [] No change. [] Other:  [x] Patient would continue to benefit from skilled physical therapy services in order to: Correct pelvic, thoracic and rib dysfunction as well as gastroc, soleus and glute max strength deficits to correct running mechanics and allow pt to train as she wishes    STG: (to be met in 5 treatments)  ? Pain:<3/10 L LE to allow pt to continue to train  ? ROM: Normal L great toe extension and ankle DF to allow for normal LE mechanics  ? Strength:5/5 glute max strength  ?  Function:Able to hold corrections made at pelvis and be able to hold ankle neutral during single leg exercises that don't involve impact   Independent with Home Exercise Programs     LTG: (to be met in 12 treatments)  No pain L LE to allow pt to run uninhibited   Able to perform SL squat and 20 reps of SL HR without valgus collapse at foot, ankle or knee  UWRI <5% limitation   Able to run as she wishes                    Patient goals:Decrease pain    Pt. Education:  [x] Yes  [] No  [] Reviewed Prior HEP/Ed  Method of Education: [x] Verbal  [x] Demo  [] Written  Get prone over pillows with hips shifted R to reduce nerve irritation as much as possible  Comprehension of Education:  [x] Verbalizes understanding. [] Demonstrates understanding. [] Needs review. [] Demonstrates/verbalizes HEP/Ed previously given.      Plan: [x] Continue current frequency toward long and short term goals w/ added treatment prn.    [] Specific Instructions for subsequent treatments:     Time In:  1205 Time Out: 5719    Electronically signed by:  Flavio Lennox, PT

## 2022-11-10 ENCOUNTER — HOSPITAL ENCOUNTER (OUTPATIENT)
Dept: PHYSICAL THERAPY | Facility: CLINIC | Age: 42
Setting detail: THERAPIES SERIES
Discharge: HOME | End: 2022-11-10
Payer: COMMERCIAL

## 2022-11-10 PROCEDURE — 97140 MANUAL THERAPY 1/> REGIONS: CPT

## 2022-11-10 PROCEDURE — 97112 NEUROMUSCULAR REEDUCATION: CPT

## 2022-11-10 NOTE — FLOWSHEET NOTE
[] [address]  [address]  [phone]  F: [phone] [] [address]  [place] [facility]   [address]  P: [phone]  F: [phone] [x] [address]  [address]  P: [phone]  F: [phone] [] [address]  P: [phone]  F: [phone] [] [address]  [facility]   [address]   [place]: [phone]  F: [phone]      Physical Therapy Daily Treatment Note    Date:  11/10/2022  Patient Name:  Aaron Barnes    :  1980  MRN: [id number]  Physician: Dr. Abebe Hernandez: [facility]  Medical Diagnosis:   L buttock pain, L hamstring                Rehab Codes: R26.89, M76.01  Onset date: 22                                         Next Dr's appt. : 22  Visit# / total visits:40  Cancels/No Shows: 0    Subjective:    Pain:  [x] Yes  [] No Location: L SI Pain Rating: (0-10 scale) 1-2/10 LB, Hip, toe all intermittent; up to 4-5/10 getting out of the car. Pain altered Tx:  [] No  [x] Yes  Action: See below  Comments: Pt reports she ran a half marathon and didn't feel too bad. States she has some R S-I pain today from raking over the weekend. Also noticed some L hip flexor tightness after the race, but better since that day. Had to  a day of work this week and then has to work 3 days in a row over the weekend. Objective: Mod R ant innominate  Manual:45 min  Continued ART/MFR to L QL, L psoas, L piriformis, L glut med; MET for innominate along with continued CST/. MFR including pelvic diaphragm release, thoracic diaphragm release, t-inlet release, OCB release, frontal, parietal, temporal, and mandibular releases added this date; continued DN (5 min) to L-S Spine, L hip, L LE homeostatics and continued NMR/NMES to L5-S1, and L LE homeostatics 10 min   Precautions:  Exercises: rev only  Exercise Reps/ Time Weight/ Level Issued for HEP   Comments   Prone             Over 2 pillows with hips shifted R  x5'    x       L sidelying over 3 pillows  x3'          Press ups  10  5\"    X  HEP inst- overpressure   Supine             March with breathing x10         Deadbug alternating LE out straight x20  x  Hover LE   Ppt w/LE circles 8ea  x  cw/ccw   Bent knee fall out x10ea   x       Sidelying             Clams 90/30 deg             1/2 side plank w/ hip abd 2x10   x       Quadruped        Alt LE x20  x     1/2 plank w/KB pullthrough x15 Purple KB      Bird dog  Verbal review  x     Gym             Hip Hike x10       Palloff Press 2x10 15#  x    Lateral step down 2 sets 4\" x x    Resisted hip extension 2x10 lime x x    Resisted diagonal hip extension 2x10 lime x x    Resisted hip flex  2x10  lime x  x    Other:     Specific Instructions for next treatment: Manual prn, along with DN with NIS/NMES prn to L LE, ex progression as indicated      Treatment Charges: Mins Units   []  Modalities     []  Ther Exercise     [x]  Manual Therapy 45 3   []  Ther Activities     [x] DN 5 NC   []  Vasocompression     [x]  NMR 10 1   Total Treatment time 60 4       Assessment: [x] Progressing toward goals. = pelvis post-treatment. Pt reports improvement with treatment overall with less L LE symptoms and improvement in being able to run/walk a half marathon. Decreased pain and symptoms post-treatment. [] No change. [] Other:  [x] Patient would continue to benefit from skilled physical therapy services in order to: Correct pelvic, thoracic and rib dysfunction as well as gastroc, soleus and glute max strength deficits to correct running mechanics and allow pt to train as she wishes    STG: (to be met in 5 treatments)  ? Pain:<3/10 L LE to allow pt to continue to train  ?  ROM: Normal L great toe extension and ankle DF to allow for normal LE mechanics  ? Strength:5/5 glute max strength  ? Function:Able to hold corrections made at pelvis and be able to hold ankle neutral during single leg exercises that don't involve impact   Independent with Home Exercise Programs     LTG: (to be met in 12 treatments)  No pain L LE to allow pt to run uninhibited   Able to perform SL squat and 20 reps of SL HR without valgus collapse at foot, ankle or knee  UWRI <5% limitation   Able to run as she wishes                    Patient goals:Decrease pain    Pt. Education:  [x] Yes  [] No  [] Reviewed Prior HEP/Ed  Method of Education: [x] Verbal  [x] Demo  [] Written  Get prone over pillows with hips shifted R to reduce nerve irritation as much as possible  Comprehension of Education:  [x] Verbalizes understanding. [] Demonstrates understanding. [] Needs review. [] Demonstrates/verbalizes HEP/Ed previously given.      Plan: [x] Continue current frequency toward long and short term goals w/ added treatment prn.    [] Specific Instructions for subsequent treatments:     Time In:  0900 Time Out: 1000    Electronically signed by:  Kisha Perez PT

## 2022-11-15 ENCOUNTER — HOSPITAL ENCOUNTER (OUTPATIENT)
Dept: PHYSICAL THERAPY | Facility: CLINIC | Age: 42
Setting detail: THERAPIES SERIES
Discharge: HOME | End: 2022-11-15
Payer: COMMERCIAL

## 2022-11-15 PROCEDURE — 97110 THERAPEUTIC EXERCISES: CPT

## 2022-11-15 PROCEDURE — 97140 MANUAL THERAPY 1/> REGIONS: CPT

## 2022-11-15 NOTE — FLOWSHEET NOTE
[] [address]  [address]  [phone]  F: [phone] [] [address]  [facility]   [address]  P: [phone]  F: [phone] [x] [address]  [address]  P: [phone]  F: [phone] [] [address]  P: [phone]  F: [phone] [] [address]  [facility]   [address]   [place]: [phone]  F: [phone]      Physical Therapy Daily Treatment Note    Date:  11/15/2022  Patient Name:  Taye Gomez    :  1980  MRN: [id number]  Physician: Dr. Lizz Cary: [facility]  Medical Diagnosis:   L buttock pain, L hamstring                Rehab Codes: R26.89, M76.01  Onset date: 22                                         Next 's appt. : 22  Visit# / total visits:41  Cancels/No Shows: 0    Subjective:    Pain:  [x] Yes  [] No Location: L SI Pain Rating: (0-10 scale) 2/10   Pain altered Tx:  [] No  [x] Yes  Action: See below  Comments: Pt states that after last treatment SI felt better as well as toe tingling. Just mild QL tightness. After marathon L hip flexor was really feeling it. Only lasted that night.      Objective:   Manual: DI hip flexor proximal, piriformis, glute med, calf, FRSR T4-8 MOB  Precautions:  Exercises:  Exercise Reps/ Time Weight/ Level Issued for HEP   Comments   Prone             Over 2 pillows with hips shifted R  x5'    x       L sidelying over 3 pillows  x3'           Press ups  10  5\"      HEP inst- overpressure   Supine             Foam roller lat to pect stretch x15       Foam roller upper back x20       March with breathing x10         Resisted hip flexion 2x10 blue x x Band at arches legs in table top   Ppt w/LE circles 8ea x  cw/ccw   Bent knee fall out x10ea   x       Sidelying             Clams 90/30 deg             1/2 side plank w/ hip abd 2x10   x       Quadruped        Primal plank donkey kick 2 sets   x    1/2 plank w/KB pullthrough 2 sets Purple KB  x    Bird dog  2 sets  x     Gym             Burrito calf stretch L 2'   x    Single leg HR isometric hold 3x30\" ea   x    Chair squat row 1 sets Red heavy band  x x    Goblet squat x30 Blue KB  x    Curtsy deadlift  Grey KB  x    Palloff Press 2x10 15#      Posterior sling on CC 2x15 15#      Lateral step down 2 sets 12\" x x    Banded toe tap hip extension 2 sets purple x x    Banded toe tap diagonal hip extension 2 sets purple x x    Powerstride  x10  18' x      Other:     Specific Instructions for next treatment: Manual prn, along with DN with NIS/NMES prn to L LE, ex progression as indicated      Treatment Charges: Mins Units   []  Modalities     [x]  Ther Exercise 45 3   [x]  Manual Therapy 15 1   []  Ther Activities     [] DN     []  Vasocompression     []  NMR     Total Treatment time 60 4       Assessment: [x] Progressing toward goals. Pt has limited DF on L vs R. Added burrito calf stretching and  SL isometric hold to program today on L. Tried SL HR isotonically but pt was unable to go through full ROM. It was very challenging for pt to hold the entire 30 sec on L and on 1 rep had to stop just shy of 30 sec latia. Challenged pt with progressive loading at hips and core. Pt has a difficult time when using UE not getting thoracic extension. With cues able to correct with good carryover. Still more strengthening and correction of mechanics and frontal and transverse plane needed. [] No change.      [] Other:  [x] Patient would continue to benefit from skilled physical therapy services in order to: Correct pelvic, thoracic and rib dysfunction as well as gastroc, soleus and glute max strength deficits to correct running mechanics and allow pt to train as she wishes    STG: (to be met in 5 treatments)  ? Pain:<3/10 L LE to allow pt to continue to train  ? ROM: Normal L great toe extension and ankle DF to allow for normal LE mechanics  ? Strength:5/5 glute max strength  ? Function:Able to hold corrections made at pelvis and be able to hold ankle neutral during single leg exercises that don't involve impact   Independent with Home Exercise Programs     LTG: (to be met in 12 treatments)  No pain L LE to allow pt to run uninhibited   Able to perform SL squat and 20 reps of SL HR without valgus collapse at foot, ankle or knee  UWRI <5% limitation   Able to run as she wishes                    Patient goals:Decrease pain    Pt. Education:  [x] Yes  [] No  [] Reviewed Prior HEP/Ed  Method of Education: [x] Verbal  [x] Demo  [] Written  Get prone over pillows with hips shifted R to reduce nerve irritation as much as possible  Comprehension of Education:  [x] Verbalizes understanding. [] Demonstrates understanding. [] Needs review. [] Demonstrates/verbalizes HEP/Ed previously given.      Plan: [x] Continue current frequency toward long and short term goals w/ added treatment prn.    [] Specific Instructions for subsequent treatments:     Time In:  1035 Time Out: 1135    Electronically signed by:  Claudia Peña PT

## 2022-11-17 ENCOUNTER — HOSPITAL ENCOUNTER (OUTPATIENT)
Dept: PHYSICAL THERAPY | Facility: CLINIC | Age: 42
Setting detail: THERAPIES SERIES
Discharge: HOME | End: 2022-11-17
Payer: COMMERCIAL

## 2022-11-17 PROCEDURE — 97112 NEUROMUSCULAR REEDUCATION: CPT

## 2022-11-17 PROCEDURE — 97140 MANUAL THERAPY 1/> REGIONS: CPT

## 2022-11-17 NOTE — FLOWSHEET NOTE
[] [address]  [address]  [phone]  F: [phone] [] [address]  [facility]   [address]  P: [phone]  F: [phone] [x] [address]  [address] [facility]  P: [phone]  F: [phone] [] [address]  P: [phone]  F: [phone] [] [address]  [facility]   [address]   [place]: [phone]  F: [phone]      Physical Therapy Daily Treatment Note    Date:  2022  Patient Name:  Singh Jett    :  1980  MRN: [id number]  [email or web address] Diagnosis:   L buttock pain, L hamstring                Rehab Codes: R26.89, M76.01  Onset date: 22                                         Next 's appt. : 22  Visit# / total visits:42  Cancels/No Shows: 0    Subjective:    Pain:  [x] Yes  [] No Location: L SI Pain Rating: (0-10 scale) 2-3/10 LB, hip  Pain altered Tx:  [] No  [x] Yes  Action: See below  Comments: Pt reports she has some L QL pain and tightness along with piriformis tightness with increasing her strengthening program and doing some cleaning over the weekend. Objective: = pelvis   Manual:45 min  Continued ART/MFR to L QL, L psoas, L piriformis, L glut med; no MET for innominate along with continued CST/. MFR including pelvic diaphragm release, thoracic diaphragm release, t-inlet release, OCB release, frontal, parietal, temporal, and mandibular releases added this date; continued DN (5 min) to L-S Spine, L hip, L LE homeostatics and continued NMR/NMES to L5-S1, and L LE homeostatics 10 min   Precautions:  Exercises: rev only  Exercise Reps/ Time Weight/ Level Issued for HEP   Comments   Prone Over 2 pillows with hips shifted R  x5'    x       L sidelying over 3 pillows  x3'          Press ups  10  5\"    X  HEP inst- overpressure   Supine             March with breathing x10         Deadbug alternating LE out straight x20  x  Hover LE   Ppt w/LE circles 8ea  x  cw/ccw   Bent knee fall out x10ea   x       Sidelying             Clams 90/30 deg             1/2 side plank w/ hip abd 2x10   x       Quadruped        Alt LE x20  x     1/2 plank w/KB pullthrough x15 Purple KB      Bird dog  Verbal review  x     Gym             Hip Hike x10       Palloff Press 2x10 15#  x    Lateral step down 2 sets 4\" x x    Resisted hip extension 2x10 lime x x    Resisted diagonal hip extension 2x10 lime x x    Resisted hip flex  2x10  lime x  x    Other:     Specific Instructions for next treatment: Manual prn, along with DN with NIS/NMES prn to L LE, ex progression as indicated      Treatment Charges: Mins Units   []  Modalities     []  Ther Exercise     [x]  Manual Therapy 45 3   []  Ther Activities     [x] DN 5 NC   []  Vasocompression     [x]  NMR 10 1   Total Treatment time 60 4       Assessment: [x] Progressing toward goals. = pelvis this date. Pt reports improvement with treatment overall. Decreased pain and symptoms post-treatment. Pt to finish up with primary PT for remaining visits unless additional DN/NMES needed if any exacerbation of symptoms occur. [] No change. [] Other:  [x] Patient would continue to benefit from skilled physical therapy services in order to: Correct pelvic, thoracic and rib dysfunction as well as gastroc, soleus and glute max strength deficits to correct running mechanics and allow pt to train as she wishes    STG: (to be met in 5 treatments)  ? Pain:<3/10 L LE to allow pt to continue to train  ? ROM: Normal L great toe extension and ankle DF to allow for normal LE mechanics  ? Strength:5/5 glute max strength  ?  Function:Able to hold corrections made at pelvis and be able to hold ankle neutral during single leg exercises that don't involve impact   Independent with Home Exercise Programs     LTG: (to be met in 12 treatments)  No pain L LE to allow pt to run uninhibited   Able to perform SL squat and 20 reps of SL HR without valgus collapse at foot, ankle or knee  UWRI <5% limitation   Able to run as she wishes                    Patient goals:Decrease pain    Pt. Education:  [x] Yes  [] No  [] Reviewed Prior HEP/Ed  Method of Education: [x] Verbal  [x] Demo  [] Written  Get prone over pillows with hips shifted R to reduce nerve irritation as much as possible  Comprehension of Education:  [x] Verbalizes understanding. [] Demonstrates understanding. [] Needs review. [] Demonstrates/verbalizes HEP/Ed previously given.      Plan: [x] Continue current frequency toward long and short term goals w/ added treatment prn.    [] Specific Instructions for subsequent treatments:     Time In:  0900 Time Out: 1000    Electronically signed by:  Colleen Israel PT

## 2022-11-21 ENCOUNTER — APPOINTMENT (OUTPATIENT)
Dept: PHYSICAL THERAPY | Facility: CLINIC | Age: 42
End: 2022-11-21
Payer: COMMERCIAL

## 2022-11-22 ENCOUNTER — HOSPITAL ENCOUNTER (OUTPATIENT)
Dept: PHYSICAL THERAPY | Facility: CLINIC | Age: 42
Setting detail: THERAPIES SERIES
Discharge: HOME | End: 2022-11-22
Payer: COMMERCIAL

## 2022-11-22 PROCEDURE — 97110 THERAPEUTIC EXERCISES: CPT

## 2022-11-22 PROCEDURE — 97140 MANUAL THERAPY 1/> REGIONS: CPT

## 2022-11-22 NOTE — FLOWSHEET NOTE
[] [address]  [address]  [phone]  F: [phone] [] [address]  [facility]   [address]  P: [phone]  F: [phone] [x] [address]  [address]  P: [phone]  F: [phone] [] [address]  P: [phone]  F: [phone] [] [address]  [facility]   [address]   [place]: [phone]  F: [phone]      Physical Therapy Daily Treatment Note    Date:  2022  Patient Name:  Aaron Barnes    :  1980  MRN: [id number]  Physician: Dr. Abebe Hernandez: [facility]  Medical Diagnosis:   L buttock pain, L hamstring                Rehab Codes: R26.89, M76.01  Onset date: 22                                         Next 's appt. : 22  Visit# / total visits:43  Cancels/No Shows: 0    Subjective:    Pain:  [x] Yes  [] No Location: L SI Pain Rating: (0-10 scale) 2-3/10 LB, hip  Pain altered Tx:  [] No  [x] Yes  Action: See below  Comments: Pt reports she has some L QL tightness and hip flexor on L but overall doing well    Objective: = pelvis   Manual:DI hip flexor proximal and distal, glute med, Sidelying crosshand to L QL  Precautions:  Exercises: rev only  Exercise Reps/ Time Weight/ Level Issued for HEP   Comments   Prone             Over 2 pillows with hips shifted R  x5'    x       L sidelying over 3 pillows  x3'          Press ups  10  5\"    X  HEP inst- overpressure   Supine             March with breathing x10         Deadbug alternating LE out straight x20  x  Hover LE   Ppt w/LE circles 8ea  x  cw/ccw   Bent knee fall out x10ea   x       Sidelying             Clams 90/30 deg             1/2 side plank w/ hip abd 2x10   x       Quadruped Alt LE x20  x     1/2 plank w/KB pullthrough x15 Purple KB      Bird dog  Verbal review  x     Gym             Hip Hike x10       Palloff Press x20 20#  x    Lateral step down 2 sets 12\" x x    [place] split squat  2x10   x MOBO   Chair squat band pull 2x10 red  x    SL HS eccentric 2x10   x    Resisted hip extension 2x10 lime x     Resisted diagonal hip extension 2x10 lime x     Bridge with Resisted hip flex  2x10  orange x  x    Other:     Specific Instructions for next treatment: Manual prn, along with DN with NIS/NMES prn to L LE, ex progression as indicated      Treatment Charges: Mins Units   []  Modalities     [x]  Ther Exercise 30 2   [x]  Manual Therapy 15 1   []  Ther Activities     [] DN     []  Vasocompression     []  NMR     Total Treatment time 45 3       Assessment: [x] Progressing toward goals. Pt needs reminders for spine neutral throughout exercises today. Pt was also fatigued entering and reported lack of sleep due to work schedule as well as doing speed workout yesterday and short run this AM. Able to correct with good carry over on same exercise but needed another cue when she moved to the next exercise. R glute max weakness causing transverse plane deviations in supine were able to be corrected with fair carryover. [] No change. [] Other:  [x] Patient would continue to benefit from skilled physical therapy services in order to: Correct pelvic, thoracic and rib dysfunction as well as gastroc, soleus and glute max strength deficits to correct running mechanics and allow pt to train as she wishes    STG: (to be met in 5 treatments)  ? Pain:<3/10 L LE to allow pt to continue to train  ? ROM: Normal L great toe extension and ankle DF to allow for normal LE mechanics  ? Strength:5/5 glute max strength  ?  Function:Able to hold corrections made at pelvis and be able to hold ankle neutral during single leg exercises that don't involve impact   Independent with Home Exercise Programs     LTG: (to be met in 12 treatments)  No pain L LE to allow pt to run uninhibited   Able to perform SL squat and 20 reps of SL HR without valgus collapse at foot, ankle or knee  UWRI <5% limitation   Able to run as she wishes                    Patient goals:Decrease pain    Pt. Education:  [x] Yes  [] No  [] Reviewed Prior HEP/Ed  Method of Education: [x] Verbal  [x] Demo  [] Written  Get prone over pillows with hips shifted R to reduce nerve irritation as much as possible  Comprehension of Education:  [x] Verbalizes understanding. [] Demonstrates understanding. [] Needs review. [] Demonstrates/verbalizes HEP/Ed previously given.      Plan: [x] Continue current frequency toward long and short term goals w/ added treatment prn.    [] Specific Instructions for subsequent treatments:     Time In:  1110 Time Out: 1155    Electronically signed by:  Ashly Tavarez PT

## 2022-11-28 ENCOUNTER — APPOINTMENT (OUTPATIENT)
Dept: PHYSICAL THERAPY | Facility: CLINIC | Age: 42
End: 2022-11-28
Payer: COMMERCIAL

## 2022-11-29 ENCOUNTER — APPOINTMENT (OUTPATIENT)
Dept: PHYSICAL THERAPY | Facility: CLINIC | Age: 42
End: 2022-11-29
Payer: COMMERCIAL

## 2022-12-13 ENCOUNTER — HOSPITAL ENCOUNTER (OUTPATIENT)
Dept: PHYSICAL THERAPY | Facility: CLINIC | Age: 42
Setting detail: THERAPIES SERIES
Discharge: HOME | End: 2022-12-13
Payer: COMMERCIAL

## 2022-12-13 PROCEDURE — 97110 THERAPEUTIC EXERCISES: CPT

## 2022-12-13 NOTE — FLOWSHEET NOTE
[] [address]  [address]  [phone]  F: [phone] [] [address]  [place] [address]   [address]  P: [phone]  F: [phone] [x] [address]  [address]  P: [phone]  F: [phone] [] [address]  P: [phone]  F: [phone] [] [address]  [facility]   [address]   [place]: [phone]  F: [phone]      Physical Therapy Daily Treatment Note    Date:  2022  Patient Name:  Valentino Ping    :  1980  MRN: [id number]  Physician: Dr. Schulz Rise: [facility]  Medical Diagnosis:   L buttock pain, L hamstring                Rehab Codes: R26.89, M76.01  Onset date: 22                                         Next 's appt. : 22  Visit# / total visits:44  Cancels/No Shows: 0    Subjective:    Pain:  [x] Yes  [] No Location: L SI Pain Rating: (0-10 scale) 2-3/10 LB, hip  Pain altered Tx:  [] No  [x] Yes  Action: See below  Comments: Pt reports she has not had N/T in foot and leg in 2 weeks but then did     Objective: = pelvis   Manual:prn  Precautions:  Exercises: rev only  Exercise Reps/ Time Weight/ Level Issued for HEP   Comments   Prone             Over 2 pillows with hips shifted R  x5'    x       L sidelying over 3 pillows  x3'          Press ups  10  5\"      HEP inst- overpressure   Supine             March with breathing x10         Deadbug w/SB x2sets  x x Hover LE   Ppt w/LE circles 8ea  x  cw/ccw   Bent knee fall out x10ea   x       Sidelying             Clams 90/30 deg             1/2 side plank w/ hip abd 2x10   x  x     Quadruped        Alt LE x20  x     1/2 plank w/KB pullthrough x15 Purple KB  x    Bird dog  Verbal review  x Gym             Burrito calf stretch 2'  x x     MOBO foot rocks x30ea 1/3,2/4 x x    RDL 2x10 Purple KB x x    Palloff Press x20 20#      Lateral step down 2 sets 12\" x     Split squat  2x10 15# x x    Chair squat band pull 2x10 red      SL HS eccentric 2x10       Resisted hip extension 2x10 lime x     Resisted diagonal hip extension 2x10 lime x     Bridge with Resisted hip flex  2x10  lime x  x    Other:     Specific Instructions for next treatment: Manual prn, along with DN with NIS/NMES prn to L LE, ex progression as indicated  NMR  5'  Pace: 9:31  Shoe:Altra Kerr  Shirley: self selected  Cue: Cued posture with 2% incline then when back to 0% cued to relax shoulders back and down    Treatment Charges: Mins Units   []  Modalities     [x]  Ther Exercise 45 3   []  Manual Therapy     []  Ther Activities     [] DN     []  Vasocompression     [x]  NMR 5 NC   Total Treatment time 45 3       Assessment: [x] Progressing toward goals. Pt  is doing well in terms of ROM and strength with Hip abd being the only weak area along with abdominals. L ankle DF also limited along with calf being a little weaker. Hip abd B 4/5. Did more core work and foot control today. Running mechanics look good with the exception of retro lean. Once pt corrected this she was more effortless in her stride and demonstrated increased hip extension. She is to work               [] No change. [] Other:  [x] Patient would continue to benefit from skilled physical therapy services in order to: Correct pelvic, thoracic and rib dysfunction as well as gastroc, soleus and glute max strength deficits to correct running mechanics and allow pt to train as she wishes    STG: (to be met in 5 treatments)  ? Pain:<3/10 L LE to allow pt to continue to train  ? ROM: Normal L great toe extension and ankle DF to allow for normal LE mechanics  ? Strength:5/5 glute max strength  ?  Function:Able to hold corrections made at pelvis and be able to hold ankle neutral during single leg exercises that don't involve impact   Independent with Home Exercise Programs     LTG: (to be met in 12 treatments)  No pain L LE to allow pt to run uninhibited   Able to perform SL squat and 20 reps of SL HR without valgus collapse at foot, ankle or knee  UWRI <5% limitation   Able to run as she wishes                    Patient goals:Decrease pain    Pt. Education:  [x] Yes  [] No  [] Reviewed Prior HEP/Ed  Method of Education: [x] Verbal  [x] Demo  [] Written  Get prone over pillows with hips shifted R to reduce nerve irritation as much as possible  Comprehension of Education:  [x] Verbalizes understanding. [] Demonstrates understanding. [] Needs review. [] Demonstrates/verbalizes HEP/Ed previously given. Plan: [x] Continue current frequency toward long and short term goals w/ added treatment prn. [x] Specific Instructions for subsequent treatments:    Follow up in 2 weeks for DC  Time In:  1035Time Out: 1125    Electronically signed by:  Flavio Lennox, PT

## 2023-01-03 ENCOUNTER — HOSPITAL ENCOUNTER (OUTPATIENT)
Dept: PHYSICAL THERAPY | Facility: CLINIC | Age: 43
Setting detail: THERAPIES SERIES
Discharge: HOME | End: 2023-01-03
Payer: COMMERCIAL

## 2023-01-03 PROCEDURE — 97110 THERAPEUTIC EXERCISES: CPT

## 2023-01-03 NOTE — DISCHARGE SUMMARY
[] [address]  [address]  [phone]  F: [phone] [] [address]  [facility]   [address]  P: [phone]  F: [phone] [x] [address]  [address]  P: [phone]  F: [phone] [] [address]  P: [phone]  F: [phone] [] [address]  [facility]   [address]   [place]: [phone]  F: [phone]      Physical Therapy Daily Treatment Note/Discharge Note    Date:  1/3/2023  Patient Name:  Chapo Rojas    :  1980  MRN: [id number]  Physician: Dr. Lyn Sleight: [facility]  Medical Diagnosis:   L buttock pain, L hamstring                Rehab Codes: R26.89, M76.01  Onset date: 22                                         Next 's appt. : 22  Visit# / total visits:45  Cancels/No Shows: 0    Subjective:    Pain:  [x] Yes  [] No Location: L SI Pain Rating: (0-10 scale) 0/10 LB, hip  Pain altered Tx:  [] No  [x] Yes  Action: See below  Comments: Pt reports she only has intermittent N/T.      Objective:     Exercises:   Exercise Reps/ Time Weight/ Level Issued for HEP   Comments   Tband UE        ext 1set purple x x    row 1set purple x x    ER 0 deg & 90 deg 1set purple x x    HAB 1set purple x x Palm up           Supine             Deadbug w/SB x2sets  x x Hover LE   Sidelying             1/2 side plank w/ hip abd 2x10   x  x     Quadruped        Bird dog  Verbal review  x x    Gym             Burrito calf stretch 2'  x     RDL 1 set Purple KB x x    Palloff Press x20 20#      Lateral step down 1 sets 8\" x x    Cypriot squat  1set  x x    Bridge with Resisted hip flex  1set  lime  Legs on 18\" step x  x    Other:    Treatment Charges: Mins Units   []  Modalities     [x]  Ther Exercise 36 2   []  Manual Therapy     []  Ther Activities     [] DN     []  Vasocompression     []  NMR     Total Treatment time 36 2       Assessment: Pt is holding pelvic position with improved consistency and less cue with exercises. Still some weakness at glute max on R upon testing this date. Hip abd and ER 5/5. Able to perform 20 reps of SL squat on 8 inch step. Only cue for pelvic position was during bird dog and [place] split squat. Pt provided with written HEP for UE/LE and appropriate resistance bands. STG: (to be met in 5 treatments)  ? Pain:<3/10 L LE to allow pt to continue to train Met   ? ROM: Normal L great toe extension and ankle DF to allow for normal LE mechanics  ? Strength:5/5 glute max strength  Partially met with L testing 5/5 R testing 4+/5  ? Function:Able to hold corrections made at pelvis and be able to hold ankle neutral during single leg exercises that don't involve impact  Met for pelvis. Still some valgus collapse at ankle with single leg exercises  Independent with Home Exercise Programs Met     LTG: (to be met in 12 treatments)  No pain L LE to allow pt to run uninhibited  Met  Able to perform SL squat and 20 reps of SL HR without valgus collapse at foot, ankle or knee Met on squat but still guillermo's mild valgus collapse at midfoot B  UWRI <5% limitation  Not Met at 8% limitation  Able to run as she wishes Met but on slower return to increasing mileage                    Patient goals:Decrease pain    Pt. Education:  [x] Yes  [] No  [] Reviewed Prior HEP/Ed  Method of Education: [x] Verbal  [x] Demo  [] Written  Access Code: [email or web address]  URL: [email or web address]  Date: 01/03/2023  Prepared by: Alicia Feliz    Exercises  Supine Hip Flexion with Resistance Loop - 1 x daily - 7 x weekly - 3 sets - 10 reps  Dead Bug with [address] - 1 x daily - 7 x weekly - 3 sets - 10 reps  Modified Side Plank with Hip Abduction - 1 x daily - 7 x weekly - 3 sets - 10 reps  Bird Dog - 1 x daily - 7 x weekly - 3 sets - 10 reps  Single Leg Deadlift with Kettlebell - 1 x daily - 7 x weekly - 3 sets - 10 reps  Lateral Step Down - 1 x daily - 7 x weekly - 3 sets - 10 reps  Side Stepping with Resistance at Feet - 1 x daily - 7 x weekly - 3 sets - 10 reps  [facility] Split Squat - 1 x daily - 7 x weekly - 3 sets - 10 reps  Sumo Squat with Dumbbell - 1 x daily - 7 x weekly - 3 sets - 10 reps    Access Code: [email or web address]  URL: [email or web address]  Date: 01/03/2023  Prepared by: Flory Dimas    Exercises  Seated Shoulder Horizontal Abduction with Resistance - Palms Down - 1 x daily - 7 x weekly - 3 sets - 10 reps  Shoulder extension with resistance - Neutral - 1 x daily - 7 x weekly - 3 sets - 10 reps  Shoulder W - External Rotation with Resistance - 1 x daily - 7 x weekly - 3 sets - 10 reps  Standing Shoulder Row with Anchored Resistance - 1 x daily - 7 x weekly - 3 sets - 10 reps      Comprehension of Education:  [x] Verbalizes understanding. [] Demonstrates understanding. [] Needs review. [] Demonstrates/verbalizes HEP/Ed previously given.      Plan: DC from PT at this time to IND HEP  Time In:  1030 Time Out: 1106    Electronically signed by:  Gilberto Monroe, PT